# Patient Record
Sex: FEMALE | Race: WHITE | Employment: UNEMPLOYED | URBAN - METROPOLITAN AREA
[De-identification: names, ages, dates, MRNs, and addresses within clinical notes are randomized per-mention and may not be internally consistent; named-entity substitution may affect disease eponyms.]

---

## 2017-05-15 ENCOUNTER — HOSPITAL ENCOUNTER (OUTPATIENT)
Dept: CT IMAGING | Age: 47
Discharge: HOME | End: 2017-05-15
Attending: INTERNAL MEDICINE | Admitting: INTERNAL MEDICINE

## 2017-05-15 DIAGNOSIS — Z13.6 ENCOUNTER FOR SCREENING FOR CARDIOVASCULAR DISORDERS: ICD-10-CM

## 2017-05-15 DIAGNOSIS — Z13.6 SCREENING FOR ISCHEMIC HEART DISEASE: ICD-10-CM

## 2017-05-24 ENCOUNTER — OFFICE VISIT (OUTPATIENT)
Dept: INFECTIOUS DISEASES | Age: 47
End: 2017-05-24

## 2017-05-24 VITALS
HEART RATE: 95 BPM | OXYGEN SATURATION: 99 % | DIASTOLIC BLOOD PRESSURE: 60 MMHG | SYSTOLIC BLOOD PRESSURE: 100 MMHG | HEIGHT: 68 IN | TEMPERATURE: 98.2 F | WEIGHT: 136 LBS | BODY MASS INDEX: 20.61 KG/M2

## 2017-05-24 DIAGNOSIS — R53.83 FATIGUE, UNSPECIFIED TYPE: ICD-10-CM

## 2017-05-24 DIAGNOSIS — M25.542 ARTHRALGIA OF BOTH HANDS: Primary | ICD-10-CM

## 2017-05-24 DIAGNOSIS — M25.541 ARTHRALGIA OF BOTH HANDS: Primary | ICD-10-CM

## 2017-05-24 PROCEDURE — 99203 OFFICE O/P NEW LOW 30 MIN: CPT | Performed by: INTERNAL MEDICINE

## 2017-05-24 RX ORDER — BUPROPION HYDROCHLORIDE 150 MG/1
150 TABLET ORAL
COMMUNITY

## 2017-05-24 RX ORDER — AMOXICILLIN 500 MG/1
1000 TABLET, FILM COATED ORAL
COMMUNITY
Start: 2017-05-22 | End: 2017-05-28

## 2017-05-24 RX ORDER — DOXYCYCLINE HYCLATE 100 MG
100 TABLET ORAL
COMMUNITY
End: 2019-08-28

## 2018-12-04 ENCOUNTER — OFFICE VISIT (OUTPATIENT)
Dept: ENT CLINIC | Age: 48
End: 2018-12-04
Payer: COMMERCIAL

## 2018-12-04 VITALS
HEART RATE: 84 BPM | BODY MASS INDEX: 19.64 KG/M2 | SYSTOLIC BLOOD PRESSURE: 114 MMHG | DIASTOLIC BLOOD PRESSURE: 73 MMHG | WEIGHT: 137.2 LBS | HEIGHT: 70 IN

## 2018-12-04 DIAGNOSIS — J32.9 CHRONIC SINUSITIS, UNSPECIFIED LOCATION: Primary | ICD-10-CM

## 2018-12-04 PROCEDURE — 99243 OFF/OP CNSLTJ NEW/EST LOW 30: CPT | Performed by: OTOLARYNGOLOGY

## 2018-12-04 PROCEDURE — 31231 NASAL ENDOSCOPY DX: CPT | Performed by: OTOLARYNGOLOGY

## 2018-12-04 RX ORDER — DOXYCYCLINE HYCLATE 100 MG
100 TABLET ORAL 2 TIMES DAILY
COMMUNITY
End: 2019-08-28

## 2018-12-04 RX ORDER — DOXYCYCLINE 40 MG/1
40 CAPSULE ORAL EVERY MORNING
COMMUNITY
End: 2019-08-28

## 2018-12-04 RX ORDER — MESALAMINE 0.38 G/1
1.5 CAPSULE, EXTENDED RELEASE ORAL DAILY
COMMUNITY
End: 2019-08-28

## 2018-12-04 ASSESSMENT — ENCOUNTER SYMPTOMS: SINUS PRESSURE: 1

## 2018-12-04 NOTE — LETTER
[facility]  [address] [place] [address] 23 [id number]  Phone: [phone]  Fax: [phone]    Angelina Ramires MD        December 4, 2018       Patient: Yoan Mills   MR Number: [id number]   YOB: 1970   Date of Visit: 12/4/2018       Dear Dr. Tang Olivares:    Thank you for the request for consultation for Daniel Lowry to me for the evaluation of sinus problems. Below are the relevant portions of my assessment and plan of care. If you have questions, please do not hesitate to call me. I look forward to following Aruna Buenrostro along with you.     Sincerely,        Manisha Hutchinson MD    CC providers:  Susan Hager MD  [facility] [address]  [address] [id number]  [address] [place] [address] Avenue: [phone]

## 2018-12-04 NOTE — PROGRESS NOTES
Subjective:      Patient ID: Ramy Chew is a 46 y.o. female. HPI  Chief Complaint   Patient presents with    Right sided sinusitis  History of Present Illness:49 yo female with hx of prior left sided sinus surgery now presents with a 2 month history of nasal congestion. Pain and pressure. Nasal obstruction. Nasal Discharge: green crusts  Nasal Obstruction: Yes right; intermittent  Post Nasal Drainage: Yes  Nasal Bleeding: No  Sense of Smell: normal  History of Reflux Symptoms: No  HIstory of Facial/Head Trauma: No  Pain/Discomfort:No  Headaches: none  Previous Allergy Testing: Yes  Previous Allergy Shots: Yes  History of Asthma: No;  Aspirin Sensitivity: No  Eye Symptoms: none  Previous Treatments: steroid sprays and immunotherapy     There is no problem list on file for this patient. No past surgical history on file. No family history on file. Social History     Social History    Marital status:      Spouse name: N/A    Number of children: N/A    Years of education: N/A     Occupational History    Not on file. Social History Main Topics    Smoking status: Never Smoker    Smokeless tobacco: Not on file    Alcohol use Not on file    Drug use: Unknown    Sexual activity: Not on file     Other Topics Concern    Not on file     Social History Narrative    No narrative on file         Review of Systems   HENT: Positive for congestion and sinus pressure. Objective:   Physical Exam   Constitutional: She is oriented to person, place, and time. She appears well-developed and well-nourished. HENT:   Head: Normocephalic and atraumatic. Not macrocephalic and not microcephalic. Head is without raccoon's eyes, without Abel's sign, without abrasion, without contusion, without laceration, without right periorbital erythema and without left periorbital erythema. Hair is normal.   Right Ear: Hearing, tympanic membrane and external ear normal. No drainage, swelling or tenderness.  No submental, no submandibular, no tonsillar, no preauricular, no posterior auricular and no occipital adenopathy present. Head (left side): No submental, no submandibular, no tonsillar, no preauricular, no posterior auricular and no occipital adenopathy present. Right cervical: No superficial cervical, no deep cervical and no posterior cervical adenopathy present. Left cervical: No superficial cervical, no deep cervical and no posterior cervical adenopathy present. Neurological: She is alert and oriented to person, place, and time. Skin: No bruising, no laceration and no lesion noted. No erythema. Psychiatric: She has a normal mood and affect. Her speech is normal and behavior is normal.     Nasal Endoscopy    Pre OP: sinusitis  Post OP: nasal obstruction, deviated septum an, turbinate hypertorphy and anterior rhinitis. Procedure: Nasal endoscopy    After obtaining verbal consent from the patient 1% lidocaine with afrin was sprayed into the nasal cavities. After allowing a time for anesthesia, a nasal endoscope was placed into the nostril. The septum, inferior, and middle turbinates were examined. The middle meatus, and sphenoethmoid recess was examined bilaterally. Cultures were not obtained from the sinuses. There were no complications. Pertinent positives included: There was not edema and purulence in the left middle meatus. There was not edema and purulence at the right middle meatus. Polyps were not identified in the sinuses. Masses were not identified. Patent left nasal sinuses. Moderate deviation to right. Turbinate hypertrophy and rhinitis     Tolerated well without complication. I attest that I was present for and did the entire procedure myself. Assessment:       Diagnosis Orders   1. Chronic sinusitis, unspecified location  CT Sinus WO Contrast           Plan:      CT sinus with past history of chronic sinusitis. Saline nasal sprays.    Switch to Rhinocort

## 2018-12-10 ENCOUNTER — HOSPITAL ENCOUNTER (OUTPATIENT)
Dept: CT IMAGING | Age: 48
Discharge: HOME | End: 2018-12-10
Payer: COMMERCIAL

## 2018-12-10 DIAGNOSIS — J32.9 CHRONIC SINUSITIS, UNSPECIFIED LOCATION: ICD-10-CM

## 2018-12-10 PROCEDURE — 70486 CT MAXILLOFACIAL W/O DYE: CPT

## 2018-12-12 ENCOUNTER — OFFICE VISIT (OUTPATIENT)
Dept: ENT CLINIC | Age: 48
End: 2018-12-12
Payer: COMMERCIAL

## 2018-12-12 VITALS — HEART RATE: 73 BPM | DIASTOLIC BLOOD PRESSURE: 67 MMHG | SYSTOLIC BLOOD PRESSURE: 110 MMHG

## 2018-12-12 DIAGNOSIS — J34.3 NASAL TURBINATE HYPERTROPHY: ICD-10-CM

## 2018-12-12 DIAGNOSIS — J34.89 NASAL OBSTRUCTION: Primary | ICD-10-CM

## 2018-12-12 PROCEDURE — 99212 OFFICE O/P EST SF 10 MIN: CPT | Performed by: OTOLARYNGOLOGY

## 2018-12-12 ASSESSMENT — ENCOUNTER SYMPTOMS
COUGH: 0
SINUS PAIN: 0
EYE PAIN: 0
SHORTNESS OF BREATH: 0
RHINORRHEA: 0
TROUBLE SWALLOWING: 0
SORE THROAT: 0
EYE REDNESS: 0
VOICE CHANGE: 0
FACIAL SWELLING: 0
NAUSEA: 0
DIARRHEA: 0
EYE ITCHING: 0
CHOKING: 0
SINUS PRESSURE: 0

## 2019-01-09 ENCOUNTER — PROCEDURE VISIT (OUTPATIENT)
Dept: ENT CLINIC | Age: 49
End: 2019-01-09
Payer: COMMERCIAL

## 2019-01-09 DIAGNOSIS — J34.3 NASAL TURBINATE HYPERTROPHY: ICD-10-CM

## 2019-01-09 DIAGNOSIS — J34.89 NASAL OBSTRUCTION: ICD-10-CM

## 2019-01-09 PROCEDURE — 30802 ABLATE INF TURBINATE SUBMUC: CPT | Performed by: OTOLARYNGOLOGY

## 2019-03-06 ENCOUNTER — OFFICE VISIT (OUTPATIENT)
Dept: ENT CLINIC | Age: 49
End: 2019-03-06

## 2019-03-06 VITALS — HEART RATE: 80 BPM | SYSTOLIC BLOOD PRESSURE: 109 MMHG | DIASTOLIC BLOOD PRESSURE: 66 MMHG

## 2019-03-06 DIAGNOSIS — Z48.89 POSTOPERATIVE VISIT: Primary | ICD-10-CM

## 2019-03-06 PROCEDURE — 99024 POSTOP FOLLOW-UP VISIT: CPT | Performed by: OTOLARYNGOLOGY

## 2019-08-27 ENCOUNTER — HOSPITAL ENCOUNTER (OUTPATIENT)
Dept: MAMMOGRAPHY | Age: 49
Discharge: HOME | End: 2019-08-27
Payer: COMMERCIAL

## 2019-08-27 DIAGNOSIS — Z12.31 VISIT FOR SCREENING MAMMOGRAM: ICD-10-CM

## 2019-08-27 PROCEDURE — 77063 BREAST TOMOSYNTHESIS BI: CPT

## 2019-08-28 ENCOUNTER — OFFICE VISIT (OUTPATIENT)
Dept: FAMILY MEDICINE CLINIC | Age: 49
End: 2019-08-28
Payer: COMMERCIAL

## 2019-08-28 VITALS
OXYGEN SATURATION: 99 % | WEIGHT: 135.6 LBS | HEART RATE: 75 BPM | BODY MASS INDEX: 19.41 KG/M2 | DIASTOLIC BLOOD PRESSURE: 70 MMHG | HEIGHT: 70 IN | SYSTOLIC BLOOD PRESSURE: 108 MMHG

## 2019-08-28 DIAGNOSIS — Z00.00 WELL ADULT EXAM: Primary | ICD-10-CM

## 2019-08-28 PROCEDURE — 99386 PREV VISIT NEW AGE 40-64: CPT | Performed by: FAMILY MEDICINE

## 2019-08-28 RX ORDER — MESALAMINE 0.38 G/1
3 CAPSULE, EXTENDED RELEASE ORAL DAILY
COMMUNITY
Start: 2018-01-15 | End: 2019-10-16 | Stop reason: ALTCHOICE

## 2019-08-28 SDOH — HEALTH STABILITY: MENTAL HEALTH: HOW OFTEN DO YOU HAVE A DRINK CONTAINING ALCOHOL?: 2-3 TIMES A WEEK

## 2019-08-28 ASSESSMENT — ENCOUNTER SYMPTOMS: RESPIRATORY NEGATIVE: 1

## 2019-09-06 DIAGNOSIS — Z00.00 WELL ADULT EXAM: ICD-10-CM

## 2019-09-06 LAB
A/G RATIO: 1.6 (ref 1.1–2.2)
ALBUMIN SERPL-MCNC: 4.4 G/DL (ref 3.4–5)
ALP BLD-CCNC: 39 U/L (ref 40–129)
ALT SERPL-CCNC: 13 U/L (ref 10–40)
ANION GAP SERPL CALCULATED.3IONS-SCNC: 12 MMOL/L (ref 3–16)
AST SERPL-CCNC: 18 U/L (ref 15–37)
BILIRUB SERPL-MCNC: 0.6 MG/DL (ref 0–1)
BUN BLDV-MCNC: 11 MG/DL (ref 7–20)
CALCIUM SERPL-MCNC: 9.3 MG/DL (ref 8.3–10.6)
CHLORIDE BLD-SCNC: 105 MMOL/L (ref 99–110)
CHOLESTEROL, TOTAL: 185 MG/DL (ref 0–199)
CO2: 24 MMOL/L (ref 21–32)
CREAT SERPL-MCNC: 0.8 MG/DL (ref 0.6–1.1)
GFR AFRICAN AMERICAN: >60
GFR NON-AFRICAN AMERICAN: >60
GLOBULIN: 2.7 G/DL
GLUCOSE BLD-MCNC: 89 MG/DL (ref 70–99)
HCT VFR BLD CALC: 41.5 % (ref 36–48)
HDLC SERPL-MCNC: 53 MG/DL (ref 40–60)
HEMOGLOBIN: 13.5 G/DL (ref 12–16)
LDL CHOLESTEROL CALCULATED: 117 MG/DL
MCH RBC QN AUTO: 32.4 PG (ref 26–34)
MCHC RBC AUTO-ENTMCNC: 32.5 G/DL (ref 31–36)
MCV RBC AUTO: 99.7 FL (ref 80–100)
PDW BLD-RTO: 13.7 % (ref 12.4–15.4)
PLATELET # BLD: 303 K/UL (ref 135–450)
PMV BLD AUTO: 7.2 FL (ref 5–10.5)
POTASSIUM SERPL-SCNC: 4.6 MMOL/L (ref 3.5–5.1)
RBC # BLD: 4.16 M/UL (ref 4–5.2)
SODIUM BLD-SCNC: 141 MMOL/L (ref 136–145)
TOTAL PROTEIN: 7.1 G/DL (ref 6.4–8.2)
TRIGL SERPL-MCNC: 73 MG/DL (ref 0–150)
TSH REFLEX: 2.16 UIU/ML (ref 0.27–4.2)
VLDLC SERPL CALC-MCNC: 15 MG/DL
WBC # BLD: 6.5 K/UL (ref 4–11)

## 2019-09-07 LAB
HIV AG/AB: NORMAL
HIV ANTIGEN: NORMAL
HIV-1 ANTIBODY: NORMAL
HIV-2 AB: NORMAL

## 2019-10-16 ENCOUNTER — TELEPHONE (OUTPATIENT)
Dept: OBGYN CLINIC | Age: 49
End: 2019-10-16

## 2019-10-16 ENCOUNTER — OFFICE VISIT (OUTPATIENT)
Dept: OBGYN CLINIC | Age: 49
End: 2019-10-16
Payer: COMMERCIAL

## 2019-10-16 VITALS
TEMPERATURE: 97.8 F | SYSTOLIC BLOOD PRESSURE: 108 MMHG | DIASTOLIC BLOOD PRESSURE: 60 MMHG | HEART RATE: 82 BPM | HEIGHT: 67 IN | WEIGHT: 133.2 LBS | BODY MASS INDEX: 20.91 KG/M2

## 2019-10-16 DIAGNOSIS — N95.2 VAGINAL ATROPHY: ICD-10-CM

## 2019-10-16 DIAGNOSIS — Z01.419 ENCNTR FOR GYN EXAM (GENERAL) (ROUTINE) W/O ABN FINDINGS: Primary | ICD-10-CM

## 2019-10-16 DIAGNOSIS — N94.10 DYSPAREUNIA IN FEMALE: ICD-10-CM

## 2019-10-16 DIAGNOSIS — Z12.4 PAP SMEAR FOR CERVICAL CANCER SCREENING: ICD-10-CM

## 2019-10-16 DIAGNOSIS — Z79.890 ON HORMONE REPLACEMENT THERAPY: ICD-10-CM

## 2019-10-16 PROCEDURE — 99386 PREV VISIT NEW AGE 40-64: CPT | Performed by: OBSTETRICS & GYNECOLOGY

## 2019-10-16 RX ORDER — MESALAMINE 1.2 G/1
1 TABLET, DELAYED RELEASE ORAL DAILY
Refills: 11 | COMMUNITY
Start: 2019-10-05

## 2019-10-16 ASSESSMENT — ENCOUNTER SYMPTOMS
DIARRHEA: 0
CONSTIPATION: 0
ABDOMINAL PAIN: 0
COUGH: 0
NAUSEA: 0
BACK PAIN: 1
VOMITING: 0
SORE THROAT: 0
SHORTNESS OF BREATH: 0

## 2019-10-18 LAB
HPV COMMENT: NORMAL
HPV TYPE 16: NOT DETECTED
HPV TYPE 18: NOT DETECTED
HPVOH (OTHER TYPES): NOT DETECTED

## 2020-02-24 ENCOUNTER — OFFICE VISIT (OUTPATIENT)
Dept: FAMILY MEDICINE CLINIC | Age: 50
End: 2020-02-24
Payer: COMMERCIAL

## 2020-02-24 VITALS
OXYGEN SATURATION: 98 % | HEART RATE: 83 BPM | BODY MASS INDEX: 21.94 KG/M2 | HEIGHT: 67 IN | SYSTOLIC BLOOD PRESSURE: 102 MMHG | DIASTOLIC BLOOD PRESSURE: 68 MMHG | WEIGHT: 139.8 LBS

## 2020-02-24 DIAGNOSIS — R00.2 PALPITATIONS: ICD-10-CM

## 2020-02-24 LAB
ANION GAP SERPL CALCULATED.3IONS-SCNC: 13 MMOL/L (ref 3–16)
BUN BLDV-MCNC: 17 MG/DL (ref 7–20)
CALCIUM SERPL-MCNC: 9.5 MG/DL (ref 8.3–10.6)
CHLORIDE BLD-SCNC: 105 MMOL/L (ref 99–110)
CO2: 24 MMOL/L (ref 21–32)
CREAT SERPL-MCNC: 0.7 MG/DL (ref 0.6–1.1)
GFR AFRICAN AMERICAN: >60
GFR NON-AFRICAN AMERICAN: >60
GLUCOSE BLD-MCNC: 87 MG/DL (ref 70–99)
HCT VFR BLD CALC: 38.6 % (ref 36–48)
HEMOGLOBIN: 13.2 G/DL (ref 12–16)
MCH RBC QN AUTO: 32.4 PG (ref 26–34)
MCHC RBC AUTO-ENTMCNC: 34.2 G/DL (ref 31–36)
MCV RBC AUTO: 94.7 FL (ref 80–100)
PDW BLD-RTO: 13.8 % (ref 12.4–15.4)
PLATELET # BLD: 299 K/UL (ref 135–450)
PMV BLD AUTO: 7.6 FL (ref 5–10.5)
POTASSIUM SERPL-SCNC: 4.1 MMOL/L (ref 3.5–5.1)
RBC # BLD: 4.07 M/UL (ref 4–5.2)
SODIUM BLD-SCNC: 142 MMOL/L (ref 136–145)
TSH REFLEX: 1.83 UIU/ML (ref 0.27–4.2)
WBC # BLD: 8 K/UL (ref 4–11)

## 2020-02-24 PROCEDURE — 99213 OFFICE O/P EST LOW 20 MIN: CPT | Performed by: FAMILY MEDICINE

## 2020-02-24 PROCEDURE — 93000 ELECTROCARDIOGRAM COMPLETE: CPT | Performed by: FAMILY MEDICINE

## 2020-02-24 ASSESSMENT — PATIENT HEALTH QUESTIONNAIRE - PHQ9
2. FEELING DOWN, DEPRESSED OR HOPELESS: 0
SUM OF ALL RESPONSES TO PHQ QUESTIONS 1-9: 0
SUM OF ALL RESPONSES TO PHQ9 QUESTIONS 1 & 2: 0
SUM OF ALL RESPONSES TO PHQ QUESTIONS 1-9: 0
1. LITTLE INTEREST OR PLEASURE IN DOING THINGS: 0

## 2020-02-24 ASSESSMENT — ENCOUNTER SYMPTOMS: RESPIRATORY NEGATIVE: 1

## 2020-02-24 NOTE — PROGRESS NOTES
Subjective:      Patient ID: Mare Ortiz is a 44 y.o. female. HPI   Pt is a of 44 y.o. female comes in today with   Chief Complaint   Patient presents with    Palpitations     Patient complains of heart palpitations in the middle of the night, has occured a couple of times. Denies anxiety, states it wakes her up at night. Month ago had 2 minute episode in the middle of the night. Could feel heart beating really hard. Recurred when driving. A few other instances where much briefer. A lot of exercise-no intolerance or exertional symptoms. Past Medical History:Reviewed  Medications:Reviewed. Allergies   Allergen Reactions    Sulfa Antibiotics Hives and Rash    Sulfamethoxazole-Trimethoprim Hives and Rash      Social hx:Reviewed. Social History     Tobacco Use   Smoking Status Never Smoker   Smokeless Tobacco Never Used     Review of Systems   Constitutional: Negative. Respiratory: Negative. Past Medical History:Reviewed  Medications:Reviewed. Allergies   Allergen Reactions    Sulfa Antibiotics Hives and Rash    Sulfamethoxazole-Trimethoprim Hives and Rash      Social hx:Reviewed. Social History     Tobacco Use   Smoking Status Never Smoker   Smokeless Tobacco Never Used     Objective:   Physical Exam  Constitutional:       Appearance: She is well-developed. HENT:      Head: Normocephalic and atraumatic. Eyes:      General: No scleral icterus. Conjunctiva/sclera: Conjunctivae normal.   Neck:      Musculoskeletal: Neck supple. Thyroid: No thyromegaly. Vascular: No carotid bruit. Cardiovascular:      Rate and Rhythm: Normal rate and regular rhythm. Heart sounds: Normal heart sounds. No murmur. Pulmonary:      Effort: Pulmonary effort is normal.      Breath sounds: Normal breath sounds. No rales. Lymphadenopathy:      Cervical: No cervical adenopathy. Skin:     General: Skin is warm and dry. Nails: There is no clubbing.      Neurological:      Mental Status: She is alert and oriented to person, place, and time. Cranial Nerves: No cranial nerve deficit. Psychiatric:         Behavior: Behavior normal.         Assessment:       Diagnosis Orders   1. Palpitations  CBC    TSH with Reflex    BASIC METABOLIC PANEL    EKG 12 Lead          Plan:      ekg nsr  Sounds benign.  Holter/echo if bloodwork okay and getting worse        Lloyd Flores MD

## 2020-05-07 ENCOUNTER — OFFICE VISIT (OUTPATIENT)
Dept: ORTHOPEDIC SURGERY | Age: 50
End: 2020-05-07
Payer: COMMERCIAL

## 2020-05-07 VITALS
DIASTOLIC BLOOD PRESSURE: 77 MMHG | BODY MASS INDEX: 22.46 KG/M2 | WEIGHT: 139.77 LBS | HEIGHT: 66 IN | HEART RATE: 76 BPM | TEMPERATURE: 97.2 F | SYSTOLIC BLOOD PRESSURE: 111 MMHG

## 2020-05-07 PROCEDURE — 20610 DRAIN/INJ JOINT/BURSA W/O US: CPT | Performed by: PHYSICIAN ASSISTANT

## 2020-05-07 PROCEDURE — 99203 OFFICE O/P NEW LOW 30 MIN: CPT | Performed by: PHYSICIAN ASSISTANT

## 2020-05-07 RX ORDER — LIDOCAINE HYDROCHLORIDE 10 MG/ML
20 INJECTION, SOLUTION INFILTRATION; PERINEURAL ONCE
Status: COMPLETED | OUTPATIENT
Start: 2020-05-07 | End: 2020-05-07

## 2020-05-07 RX ORDER — METHYLPREDNISOLONE ACETATE 40 MG/ML
40 INJECTION, SUSPENSION INTRA-ARTICULAR; INTRALESIONAL; INTRAMUSCULAR; SOFT TISSUE ONCE
Status: COMPLETED | OUTPATIENT
Start: 2020-05-07 | End: 2020-05-07

## 2020-05-07 RX ADMIN — METHYLPREDNISOLONE ACETATE 40 MG: 40 INJECTION, SUSPENSION INTRA-ARTICULAR; INTRALESIONAL; INTRAMUSCULAR; SOFT TISSUE at 10:54

## 2020-05-07 RX ADMIN — LIDOCAINE HYDROCHLORIDE 20 ML: 10 INJECTION, SOLUTION INFILTRATION; PERINEURAL at 10:52

## 2020-05-12 NOTE — PROGRESS NOTES
Past Surgical History:   Procedure Laterality Date    COLONOSCOPY      Every 2-3 years    NECK SURGERY      SINUS SURGERY      TONSILLECTOMY         Family History   Problem Relation Age of Onset    Heart Disease Paternal Grandfather     Heart Attack Paternal Grandfather     Thyroid Disease Paternal Grandmother     No Known Problems Maternal Grandfather     Heart Disease Father     Rheum Arthritis Mother     No Known Problems Brother     Other Sister         chrones    No Known Problems Brother     No Known Problems Brother     Other Sister         chrones    Liver Disease Sister         hep c    No Known Problems Brother     No Known Problems Brother     No Known Problems Daughter     No Known Problems Son        Social History     Socioeconomic History    Marital status:      Spouse name: None    Number of children: None    Years of education: None    Highest education level: None   Occupational History    None   Social Needs    Financial resource strain: None    Food insecurity     Worry: None     Inability: None    Transportation needs     Medical: None     Non-medical: None   Tobacco Use    Smoking status: Never Smoker    Smokeless tobacco: Never Used   Substance and Sexual Activity    Alcohol use: Yes     Frequency: 2-3 times a week     Comment: social    Drug use: Never    Sexual activity: Yes     Partners: Male   Lifestyle    Physical activity     Days per week: None     Minutes per session: None    Stress: None   Relationships    Social connections     Talks on phone: None     Gets together: None     Attends Yazdanism service: None     Active member of club or organization: None     Attends meetings of clubs or organizations: None     Relationship status: None    Intimate partner violence     Fear of current or ex partner: None     Emotionally abused: None     Physically abused: None     Forced sexual activity: None   Other Topics Concern    None   Social was educated on decreasing her activity in order to let the pain and inflammation her right knee to calm down. Patient was also educated on other conservative therapy that she can utilize for her condition as detailed in the treatment plan below. She was counseled on appropriate progressive regimen to increase her running/cardiovascular exercise. Patient notes that she does have an elliptical at home and she was encouraged to utilize this with no increase in elevation or incline. Patient should return to the office for follow-up evaluation in 4 to 6 weeks should her pain persist or sooner should her condition worsen. All the patient's questions were answered while in the clinic. The patient is understanding of all instructions and agrees with the plan. Patient does not smoke and did not require smoking cessation patient education. Treatment Plan:    1. Observe postinjection precautions  2. Decrease exercise and recreational activity with a progressive return as discussed  3. Activity modification  4. Ice 20 minutes ever 1-2 hours PRN  5. NSAIDs as needed  6. Rest   7. Elevation at least 2 inches above the heart with pillows supporting the joint underneath  8. Lightweight exercise/low impact exercise  9. Appropriate diet/weight management      Follow up in: 4 to 6 weeks or as needed              OXANA Lacey    Physician Assistant - Certified  [employer] and [address] [place] [address]    05/12/20 12:19 PM          This dictation was performed with a verbal recognition program (DRAGON) and it was checked for errors. It is possible that there are still dictated errors within this office note. If so, please bring any errors to my attention for an addendum. All efforts were made to ensure that this office note is accurate.

## 2020-05-20 LAB
A/G RATIO: 1.5 (ref 1.1–2.2)
ALBUMIN SERPL-MCNC: 5.1 G/DL (ref 3.4–5)
ALP BLD-CCNC: 43 U/L (ref 40–129)
ALT SERPL-CCNC: 12 U/L (ref 10–40)
ANION GAP SERPL CALCULATED.3IONS-SCNC: 14 MMOL/L (ref 3–16)
AST SERPL-CCNC: 14 U/L (ref 15–37)
BASOPHILS ABSOLUTE: 0.1 K/UL (ref 0–0.2)
BASOPHILS RELATIVE PERCENT: 1.3 %
BILIRUB SERPL-MCNC: 0.4 MG/DL (ref 0–1)
BUN BLDV-MCNC: 15 MG/DL (ref 7–20)
C-REACTIVE PROTEIN: 0.8 MG/L (ref 0–5.1)
CALCIUM SERPL-MCNC: 10.1 MG/DL (ref 8.3–10.6)
CHLORIDE BLD-SCNC: 102 MMOL/L (ref 99–110)
CO2: 24 MMOL/L (ref 21–32)
CREAT SERPL-MCNC: 1.1 MG/DL (ref 0.6–1.1)
EOSINOPHILS ABSOLUTE: 0.6 K/UL (ref 0–0.6)
EOSINOPHILS RELATIVE PERCENT: 6.5 %
GFR AFRICAN AMERICAN: >60
GFR NON-AFRICAN AMERICAN: 52
GLOBULIN: 3.4 G/DL
GLUCOSE BLD-MCNC: 81 MG/DL (ref 70–99)
HCT VFR BLD CALC: 42.8 % (ref 36–48)
HEMOGLOBIN: 14.2 G/DL (ref 12–16)
LYMPHOCYTES ABSOLUTE: 2.2 K/UL (ref 1–5.1)
LYMPHOCYTES RELATIVE PERCENT: 25.8 %
MCH RBC QN AUTO: 31.8 PG (ref 26–34)
MCHC RBC AUTO-ENTMCNC: 33.3 G/DL (ref 31–36)
MCV RBC AUTO: 95.5 FL (ref 80–100)
MONOCYTES ABSOLUTE: 0.5 K/UL (ref 0–1.3)
MONOCYTES RELATIVE PERCENT: 5.9 %
NEUTROPHILS ABSOLUTE: 5.1 K/UL (ref 1.7–7.7)
NEUTROPHILS RELATIVE PERCENT: 60.5 %
PDW BLD-RTO: 13.4 % (ref 12.4–15.4)
PLATELET # BLD: 329 K/UL (ref 135–450)
PMV BLD AUTO: 7.7 FL (ref 5–10.5)
POTASSIUM SERPL-SCNC: 4 MMOL/L (ref 3.5–5.1)
RBC # BLD: 4.49 M/UL (ref 4–5.2)
SODIUM BLD-SCNC: 140 MMOL/L (ref 136–145)
TOTAL PROTEIN: 8.5 G/DL (ref 6.4–8.2)
WBC # BLD: 8.4 K/UL (ref 4–11)

## 2020-07-17 ENCOUNTER — NURSE ONLY (OUTPATIENT)
Dept: PRIMARY CARE CLINIC | Age: 50
End: 2020-07-17
Payer: COMMERCIAL

## 2020-07-17 PROCEDURE — 99211 OFF/OP EST MAY X REQ PHY/QHP: CPT | Performed by: NURSE PRACTITIONER

## 2020-07-17 NOTE — PROGRESS NOTES
Marilu Lutz received a viral test for COVID-19. They were educated on isolation and quarantine as appropriate. For any symptoms, they were directed to seek care from their PCP, given contact information to establish with a doctor, directed to an urgent care or the emergency room.

## 2020-07-20 LAB
REPORT: NORMAL
SARS-COV-2: NOT DETECTED
THIS TEST SENT TO: NORMAL

## 2020-07-21 ENCOUNTER — TELEPHONE (OUTPATIENT)
Dept: FAMILY MEDICINE CLINIC | Age: 50
End: 2020-07-21

## 2020-07-21 NOTE — TELEPHONE ENCOUNTER
----- Message from Cj Delatorre. sent at 7/21/2020  3:36 PM EDT -----  Subject: Referral Request    QUESTIONS   Reason for referral request? Dermatologist     Has the physician seen you for this condition before? No     Preferred Specialist (if applicable)? Yuliet Parsons    Do you already have an appointment scheduled? No    Additional Information for Provider? Patient would like referral sent to [address] [place] [address]   ---------------------------------------------------------------------------  --------------  [address]  What is the best way for the office to contact you? OK to leave message on   voicemail   OK to respond with secure message via [employer] portal (only for patients   who have registered [employer] account)  Preferred Call Back Phone Number?  [phone]

## 2020-07-23 ENCOUNTER — ANESTHESIA (OUTPATIENT)
Dept: ENDOSCOPY | Age: 50
End: 2020-07-23
Payer: COMMERCIAL

## 2020-07-23 ENCOUNTER — ANESTHESIA EVENT (OUTPATIENT)
Dept: ENDOSCOPY | Age: 50
End: 2020-07-23
Payer: COMMERCIAL

## 2020-07-23 ENCOUNTER — HOSPITAL ENCOUNTER (OUTPATIENT)
Age: 50
Setting detail: OUTPATIENT SURGERY
Discharge: HOME | End: 2020-07-23
Attending: INTERNAL MEDICINE | Admitting: INTERNAL MEDICINE
Payer: COMMERCIAL

## 2020-07-23 VITALS
RESPIRATION RATE: 18 BRPM | SYSTOLIC BLOOD PRESSURE: 133 MMHG | TEMPERATURE: 97.6 F | DIASTOLIC BLOOD PRESSURE: 73 MMHG | HEIGHT: 68 IN | HEART RATE: 72 BPM | WEIGHT: 133 LBS | OXYGEN SATURATION: 100 % | BODY MASS INDEX: 20.16 KG/M2

## 2020-07-23 VITALS
OXYGEN SATURATION: 99 % | DIASTOLIC BLOOD PRESSURE: 71 MMHG | SYSTOLIC BLOOD PRESSURE: 92 MMHG | RESPIRATION RATE: 13 BRPM

## 2020-07-23 PROCEDURE — 3609015300 HC ESOPHAGEAL DILATION MALONEY: Performed by: INTERNAL MEDICINE

## 2020-07-23 PROCEDURE — 3700000000 HC ANESTHESIA ATTENDED CARE: Performed by: INTERNAL MEDICINE

## 2020-07-23 PROCEDURE — 2709999900 HC NON-CHARGEABLE SUPPLY: Performed by: INTERNAL MEDICINE

## 2020-07-23 PROCEDURE — 2580000003 HC RX 258: Performed by: ANESTHESIOLOGY

## 2020-07-23 PROCEDURE — 3609012400 HC EGD TRANSORAL BIOPSY SINGLE/MULTIPLE: Performed by: INTERNAL MEDICINE

## 2020-07-23 PROCEDURE — 6360000002 HC RX W HCPCS: Performed by: NURSE ANESTHETIST, CERTIFIED REGISTERED

## 2020-07-23 PROCEDURE — 7100000010 HC PHASE II RECOVERY - FIRST 15 MIN: Performed by: INTERNAL MEDICINE

## 2020-07-23 PROCEDURE — 3609010300 HC COLONOSCOPY W/BIOPSY SINGLE/MULTIPLE: Performed by: INTERNAL MEDICINE

## 2020-07-23 PROCEDURE — 88305 TISSUE EXAM BY PATHOLOGIST: CPT

## 2020-07-23 PROCEDURE — 7100000011 HC PHASE II RECOVERY - ADDTL 15 MIN: Performed by: INTERNAL MEDICINE

## 2020-07-23 PROCEDURE — 3700000001 HC ADD 15 MINUTES (ANESTHESIA): Performed by: INTERNAL MEDICINE

## 2020-07-23 RX ORDER — PROPOFOL 10 MG/ML
INJECTION, EMULSION INTRAVENOUS PRN
Status: DISCONTINUED | OUTPATIENT
Start: 2020-07-23 | End: 2020-07-23 | Stop reason: SDUPTHER

## 2020-07-23 RX ORDER — SODIUM CHLORIDE, SODIUM LACTATE, POTASSIUM CHLORIDE, CALCIUM CHLORIDE 600; 310; 30; 20 MG/100ML; MG/100ML; MG/100ML; MG/100ML
INJECTION, SOLUTION INTRAVENOUS CONTINUOUS
Status: DISCONTINUED | OUTPATIENT
Start: 2020-07-23 | End: 2020-07-23 | Stop reason: HOSPADM

## 2020-07-23 RX ADMIN — PROPOFOL 100 MG: 10 INJECTION, EMULSION INTRAVENOUS at 10:17

## 2020-07-23 RX ADMIN — PROPOFOL 50 MG: 10 INJECTION, EMULSION INTRAVENOUS at 10:36

## 2020-07-23 RX ADMIN — PROPOFOL 50 MG: 10 INJECTION, EMULSION INTRAVENOUS at 10:23

## 2020-07-23 RX ADMIN — PROPOFOL 100 MG: 10 INJECTION, EMULSION INTRAVENOUS at 10:12

## 2020-07-23 RX ADMIN — SODIUM CHLORIDE, POTASSIUM CHLORIDE, SODIUM LACTATE AND CALCIUM CHLORIDE: 600; 310; 30; 20 INJECTION, SOLUTION INTRAVENOUS at 09:41

## 2020-07-23 RX ADMIN — PROPOFOL 50 MG: 10 INJECTION, EMULSION INTRAVENOUS at 10:27

## 2020-07-23 ASSESSMENT — PULMONARY FUNCTION TESTS
PIF_VALUE: 0
PIF_VALUE: 2
PIF_VALUE: 0

## 2020-07-23 ASSESSMENT — LIFESTYLE VARIABLES: SMOKING_STATUS: 0

## 2020-07-23 ASSESSMENT — PAIN SCALES - GENERAL: PAINLEVEL_OUTOF10: 0

## 2020-07-23 ASSESSMENT — PAIN - FUNCTIONAL ASSESSMENT: PAIN_FUNCTIONAL_ASSESSMENT: 0-10

## 2020-07-23 NOTE — OP NOTE
[address] [place] [address]  Endoscopy Note    Patient: Brunetta Dakin  : 1970     Procedure: Colonoscopy    Date:  2020    Surgeon:  Owen Kwon MD    Anesthesia:  MAC    Indications:  Crohn's disease    Procedure: An informed consent was obtained from the patient after explanation of indications, benefits, possible risks and complications of the procedure. The patient was then taken to the endoscopy suite, placed in the left lateral decubitus position, and the above IV anesthesia was administered. A digital rectal examination was performed and revealed negative without mass, lesions or tenderness, external hemorrhoids noted. The Olympus CFQ-180-AL video colonoscope was placed in the patient's rectum under digital direction and advanced to the cecum. The cecum was identified by characteristic anatomy and ballottment. The prep was good. The scope was then withdrawn back through the cecum, ascending, transverse, descending and sigmoid colons. The scope was then withdrawn into the rectum and retroflexed. The retroflexed view of the anal verge and rectum demonstrates no abnormalities. The scope was straightened, the colon was decompressed and the scope was withdrawn from the patient. The patient tolerated the procedure well and was taken to the PACU in good condition. Findings:     Terminal ileum: normal.   Cecum: loss of vascularity noted, no active inflammation.  Ascending Colon: loss of vascularity noted, no active inflammation.  Transverse Colon: loss of vascularity noted, no active inflammation.  Descending Colon: loss of vascularity noted, no active inflammation.  Sigmoid Colon: loss of vascularity noted, no active inflammation.  Rectum: loss of vascularity noted, no active inflammation. Multiple biopsies were taken from the colon at 10 cm intervals.   Estimated Blood Loss: None      Impression:     No active inflammation   External hemorrhoids. Recommendations:     Patient to call for biopsy results in 1 week.  Repeat Colonoscopy in 3 years.  Follow up with DR. Swann.     Deshawn Claudio, [address] [facility] [address]  7/23/2020

## 2020-07-23 NOTE — ANESTHESIA POSTPROCEDURE EVALUATION
Department of Anesthesiology  Postprocedure Note    Patient: Brain Ryan  MRN: [id number]  YOB: 1970  Date of evaluation: 7/23/2020  Time:  10:52 AM     Procedure Summary     Date:  07/23/20 Room / Location:  [facility]    Anesthesia Start:  4051 Anesthesia Stop:  4776    Procedures:       EGD BIOPSY (N/A )      COLONOSCOPY WITH BIOPSY (N/A )      ESOPHAGEAL DILATION LEIGH (N/A ) Diagnosis:  (CROHN'S DISEASE, DIARRHEA)    Surgeon:  Chapo Sarmiento MD Responsible Provider:  Army Palomo DO    Anesthesia Type:  MAC ASA Status:  2          Anesthesia Type: MAC    Sri Phase I: Sri Score: 10    Sri Phase II:      Last vitals: Reviewed and per EMR flowsheets.        Anesthesia Post Evaluation    Patient location during evaluation: PACU  Patient participation: complete - patient participated  Level of consciousness: awake and alert  Pain score: 0  Airway patency: patent  Nausea & Vomiting: no nausea and no vomiting  Complications: no  Cardiovascular status: hemodynamically stable  Respiratory status: acceptable  Hydration status: stable

## 2020-07-23 NOTE — PROGRESS NOTES
Ambulatory Surgery/Procedure Discharge Note    Vitals:    07/23/20 1104   BP: 133/73   Pulse: 72   Resp: 18   Temp: 97.6 °F (36.4 °C)   SpO2: 100%     Patient meets discharge criteria based on Sri score. In: 36 [P.O.:120; I.V.:700]  Out: -     Restroom use offered before discharge. Yes    Pain assessment:  none  Pain Level: 0    Discharge instructions reviewed with  Nesha Felipe, no prescriptions this visit. Abdomen soft, non-distended, + flatus. Denies any pain or discomfort. Patient to call Dr. Celso Vuong in 1 week for biospy results. Patient discharged to home/self care.  Patient discharged via wheel chair by transporter to waiting family/S.O.       7/23/2020 11:06 AM

## 2020-07-23 NOTE — ANESTHESIA PRE PROCEDURE
Department of Anesthesiology  Preprocedure Note       Name:  Amira Robles   Age:  44 y.o.  :  1970                                          MRN:  [id number]         Date:  2020      Surgeon: Odalis Penn):  Livia Deluna MD    Procedure: Procedure(s):  EGD ESOPHAGOGASTRODUODENOSCOPY  COLONOSCOPY DIAGNOSTIC    Medications prior to admission:   Prior to Admission medications    Medication Sig Start Date End Date Taking? Authorizing Provider   mesalamine (LIALDA) 1.2 g EC tablet Take 1 tablet by mouth daily 10/5/19  Yes Historical Provider, MD   progesterone (PROMETRIUM) 200 MG capsule Take 200 mg by mouth daily   Yes Historical Provider, MD   buPROPion (WELLBUTRIN XL) 150 MG extended release tablet Take 150 mg by mouth   Yes Historical Provider, MD       Current medications:    No current facility-administered medications for this encounter. Allergies:     Allergies   Allergen Reactions    Sulfa Antibiotics Hives and Rash    Sulfamethoxazole-Trimethoprim Hives and Rash       Problem List:    Patient Active Problem List   Diagnosis Code    On hormone replacement therapy Z79.890       Past Medical History:        Diagnosis Date    Abnormal Pap smear of cervix     Anxiety     Autoimmune disorder ([facility]     Crohn disease ([facility]     Depression     Menopausal symptoms     Scoliosis        Past Surgical History:        Procedure Laterality Date    BACK SURGERY      jia placed for scoliosis age 13    COLONOSCOPY      Every 2-3 years    NECK SURGERY      SINUS SURGERY      TONSILLECTOMY         Social History:    Social History     Tobacco Use    Smoking status: Never Smoker    Smokeless tobacco: Never Used   Substance Use Topics    Alcohol use: Yes     Frequency: 2-3 times a week     Comment: 2-3 times per week                                Counseling given: Not Answered      Vital Signs (Current):   Vitals:    20 0909   BP: 107/67   Pulse: 64   Resp: 18   Temp: 97.8 °F (36.6 °C)   TempSrc: Oral   SpO2: 100%   Weight: 133 lb (60.3 kg)   Height: 5' 8\" (1.727 m)                                              BP Readings from Last 3 Encounters:   07/23/20 107/67   05/07/20 111/77   02/24/20 102/68       NPO Status: Time of last liquid consumption: 2200                        Time of last solid consumption: 2000                        Date of last liquid consumption: 07/22/20                        Date of last solid food consumption: 07/21/20    BMI:   Wt Readings from Last 3 Encounters:   07/23/20 133 lb (60.3 kg)   05/07/20 139 lb 12.4 oz (63.4 kg)   02/24/20 139 lb 12.8 oz (63.4 kg)     Body mass index is 20.22 kg/m². CBC:   Lab Results   Component Value Date    WBC 8.4 05/20/2020    RBC 4.49 05/20/2020    HGB 14.2 05/20/2020    HCT 42.8 05/20/2020    MCV 95.5 05/20/2020    RDW 13.4 05/20/2020     05/20/2020       CMP:   Lab Results   Component Value Date     05/20/2020    K 4.0 05/20/2020     05/20/2020    CO2 24 05/20/2020    BUN 15 05/20/2020    CREATININE 1.1 05/20/2020    GFRAA >60 05/20/2020    AGRATIO 1.5 05/20/2020    LABGLOM 52 05/20/2020    GLUCOSE 81 05/20/2020    PROT 8.5 05/20/2020    CALCIUM 10.1 05/20/2020    BILITOT 0.4 05/20/2020    ALKPHOS 43 05/20/2020    AST 14 05/20/2020    ALT 12 05/20/2020       POC Tests: No results for input(s): POCGLU, POCNA, POCK, POCCL, POCBUN, POCHEMO, POCHCT in the last 72 hours.     Coags: No results found for: PROTIME, INR, APTT    HCG (If Applicable): No results found for: PREGTESTUR, PREGSERUM, HCG, HCGQUANT     ABGs: No results found for: PHART, PO2ART, NVT4YMG, UOA7KGV, BEART, I5UJZEIL     Type & Screen (If Applicable):  No results found for: LABABO, LABRH    Drug/Infectious Status (If Applicable):  No results found for: HIV, HEPCAB    COVID-19 Screening (If Applicable):   Lab Results   Component Value Date    COVID19 Not Detected 07/17/2020         Anesthesia Evaluation  Patient summary reviewed and Nursing notes reviewed no history of anesthetic complications:   Airway: Mallampati: I  TM distance: >3 FB   Neck ROM: full  Mouth opening: > = 3 FB Dental: normal exam         Pulmonary: breath sounds clear to auscultation      (-) not a current smoker (never)                           Cardiovascular:  Exercise tolerance: good (>4 METS),       (-) past MI    NYHA Classification: I    Rhythm: regular  Rate: normal           Beta Blocker:  Not on Beta Blocker         Neuro/Psych:               GI/Hepatic/Renal:   (+) morbid obesity         ROS comment: crohns dz . Endo/Other:                     Abdominal:           Vascular:                                        Anesthesia Plan      MAC     ASA 2       Induction: intravenous. MIPS: Prophylactic antiemetics administered. Anesthetic plan and risks discussed with patient. Plan discussed with CRNA.     Attending anesthesiologist reviewed and agrees with Pre Eval content              Ed Sauceda DO   7/23/2020

## 2020-07-23 NOTE — OP NOTE
[address] [place] [address]  Endoscopy Note    Patient: Brunetta Dakin  : 1970     Procedure: Esophagogastroduodenoscopy with biopsy and esophageal balloon dilation    Date:  2020     Anesthesia: MAC    Indications: Dysphagia. Description of Procedure:  Informed consent was obtained from the patient after explanation of indications, benefits and possible risks and complications of the procedure. The procedure was done at bedside. Patient was placed in the left lateral decubitus position and the above IV sedation was administrered. The Olympus videoendoscope was placed in the patient's mouth and under direct visualization passed into the esophagus. The scope was then advanced into the stomach and then into the second portion of the duodenum. · The duodenum showed no abnormalities. · The stomach showed non erosive gastritis in the antrum and body. Biopsies taken. · Esophagus demonstrated no abnormalities. However, given her symptoms of dysphagia, esophagus was dilated with 54 Fr parish dilator. Retroflexed views of the cardia and fundus showed no other abnormalities. The scope was anteflexed and the stomach was decompressed, and the scope was completely withdrawn. The patient tolerated the procedure well. EBL: None    Impression:     Normal esophagus, dilated to 54 Fr.   Non erosive gastritis      Recommendations:    Patient to call for biopsy results in 7 days.    PPI    Owen Kwon MD  [address] [facility]

## 2020-07-23 NOTE — H&P
History and Physical / Pre-Sedation Assessment      PMHx:   Past Medical History:   Diagnosis Date    Abnormal Pap smear of cervix     Anxiety     Autoimmune disorder (HCC)     Crohn disease ([facility] [address]     Depression     Menopausal symptoms     Scoliosis        Medications:   Prior to Admission medications    Medication Sig Start Date End Date Taking? Authorizing Provider   mesalamine (LIALDA) 1.2 g EC tablet Take 1 tablet by mouth daily 10/5/19  Yes Historical Provider, MD   progesterone (PROMETRIUM) 200 MG capsule Take 200 mg by mouth daily   Yes Historical Provider, MD   buPROPion (WELLBUTRIN XL) 150 MG extended release tablet Take 150 mg by mouth   Yes Historical Provider, MD       Allergies:    Allergies   Allergen Reactions    Sulfa Antibiotics Hives and Rash    Sulfamethoxazole-Trimethoprim Hives and Rash       PSHx:   Past Surgical History:   Procedure Laterality Date    BACK SURGERY      jia placed for scoliosis age 14    COLONOSCOPY      Every 2-3 years    NECK SURGERY      SINUS SURGERY      TONSILLECTOMY         Social Hx:   Social History     Socioeconomic History    Marital status:      Spouse name: Not on file    Number of children: Not on file    Years of education: Not on file    Highest education level: Not on file   Occupational History    Not on file   Social Needs    Financial resource strain: Not on file    Food insecurity     Worry: Not on file     Inability: Not on file    Transportation needs     Medical: Not on file     Non-medical: Not on file   Tobacco Use    Smoking status: Never Smoker    Smokeless tobacco: Never Used   Substance and Sexual Activity    Alcohol use: Yes     Frequency: 2-3 times a week     Comment: 2-3 times per week    Drug use: Never    Sexual activity: Yes     Partners: Male   Lifestyle    Physical activity     Days per week: Not on file     Minutes per session: Not on file    Stress: Not on file   Relationships    Social connections Talks on phone: Not on file     Gets together: Not on file     Attends Mandaen service: Not on file     Active member of club or organization: Not on file     Attends meetings of clubs or organizations: Not on file     Relationship status: Not on file    Intimate partner violence     Fear of current or ex partner: Not on file     Emotionally abused: Not on file     Physically abused: Not on file     Forced sexual activity: Not on file   Other Topics Concern    Not on file   Social History Narrative    Not on file       Family Hx:   Family History   Problem Relation Age of Onset    Heart Disease Paternal Grandfather     Heart Attack Paternal Grandfather     Thyroid Disease Paternal Grandmother     No Known Problems Maternal Grandfather     Heart Disease Father     Rheum Arthritis Mother     No Known Problems Brother     Other Sister         chrones    No Known Problems Brother     No Known Problems Brother     Other Sister         chrones    Liver Disease Sister         hep c    No Known Problems Brother     No Known Problems Brother     No Known Problems Daughter     No Known Problems Son        Physical Exam:  Vital Signs: /67   Pulse 64   Temp 97.8 °F (36.6 °C) (Oral)   Resp 18   Ht 5' 8\" (1.727 m)   Wt 133 lb (60.3 kg)   SpO2 100%   BMI 20.22 kg/m²    Airway: Mallampati: II (soft palate, uvula, fauces visible)  Pulmonary:Normal  Cardiac:Normal  Abdomen:Normal    Pre-Procedure Assessment / Plan:  ASA: Class 2 - A normal healthy patient with mild systemic disease  Level of Sedation Plan:Deep sedation  Post Procedure plan: Return to same level of care    I assessed the patient and find that the patient is in satisfactory condition to proceed with the planned procedure and sedation plan. I have explained the risk, benefits, and alternatives to the procedure; the patient understands and agrees to proceed.        Donte Ramirez  7/23/2020

## 2020-08-03 ENCOUNTER — TELEPHONE (OUTPATIENT)
Dept: DERMATOLOGY | Age: 50
End: 2020-08-03

## 2020-08-06 ENCOUNTER — OFFICE VISIT (OUTPATIENT)
Dept: DERMATOLOGY | Age: 50
End: 2020-08-06
Payer: COMMERCIAL

## 2020-08-06 VITALS — TEMPERATURE: 98.1 F

## 2020-08-06 PROCEDURE — 11103 TANGNTL BX SKIN EA SEP/ADDL: CPT | Performed by: DERMATOLOGY

## 2020-08-06 PROCEDURE — 11102 TANGNTL BX SKIN SINGLE LES: CPT | Performed by: DERMATOLOGY

## 2020-08-06 PROCEDURE — 99243 OFF/OP CNSLTJ NEW/EST LOW 30: CPT | Performed by: DERMATOLOGY

## 2020-08-06 NOTE — PATIENT INSTRUCTIONS
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
## 2020-08-06 NOTE — Clinical Note
Dear Bessie Ponce,    I had the pleasure of seeing your patient, Kalen Lima, in my office today. Thanks so much for involving me in her care. Please see my note and call me if you have any questions.     Clarence Gould

## 2020-08-11 LAB — DERMATOLOGY PATHOLOGY REPORT: ABNORMAL

## 2020-08-14 ENCOUNTER — TELEPHONE (OUTPATIENT)
Dept: DERMATOLOGY | Age: 50
End: 2020-08-14

## 2020-08-14 NOTE — TELEPHONE ENCOUNTER
----- Message from Noe Fong MD sent at 8/11/2020  9:38 AM EDT -----  -Midline upper chest - Schedule curettage     -Upper back, L of midline - Atypical mole. Excised via biopsy, so no treatment needed. Atypical moles are risk factors for melanoma.

## 2020-08-14 NOTE — TELEPHONE ENCOUNTER
Lm for patient to [facility] call for biopsy results. Will inform her:    1.A. MIDLINE UPPER CHEST-   Basal Cell Carcinoma, Superficial Type  Schedule curettage (KW, will offer 9-3)    B. UPPER BACK LEFT OF MIDLINE-   Dysplastic nevus with moderate dysplasia, completely   Excised  Atypical mole. Excised via biopsy, so no treatment needed.  Atypical moles are risk factors for melanoma

## 2020-08-18 ENCOUNTER — OFFICE VISIT (OUTPATIENT)
Dept: ORTHOPEDIC SURGERY | Age: 50
End: 2020-08-18
Payer: COMMERCIAL

## 2020-08-18 VITALS — BODY MASS INDEX: 20.15 KG/M2 | HEIGHT: 68 IN | TEMPERATURE: 97.9 F | WEIGHT: 132.94 LBS

## 2020-08-18 PROCEDURE — 99213 OFFICE O/P EST LOW 20 MIN: CPT | Performed by: ORTHOPAEDIC SURGERY

## 2020-08-18 ASSESSMENT — ENCOUNTER SYMPTOMS: ROS SKIN COMMENTS: SKIN CONDITION / CANCER

## 2020-08-18 NOTE — PROGRESS NOTES
Chief Complaint    Follow-up (right knee )    History of Present Illness:  Ilia Junior is a 44 y.o. female with past medical history of a right patellar dislocation who presents for evaluation of her right knee pain. Patient states that she has had an insidious onset of right knee pain beginning back in March. Due to the COVID-19 pandemic the patient had not been able to go to her boxing gym in order to work out where she is active 5-6 days a week. As a replacement regimen for this the patient began running 4 miles 5 to 6 days a week. Patient notes that she did not have a training regimen or a protocol for the progressive increase in the distance of her running. She notes that she does not usually run on a regular basis prior to March. At her last visit in May, she noted majority of her pain is patellofemoral and in the anterior joint line of the knee. At her visit in May, she received a corticosteroid injection and it was determined that she was peeling pain related to possible inflammation within the patellar tendon, infrapatellar fat pad as well as some mild aggravation of pre-existing osteoarthritis within the knee joint. Following this visit, she reduced her activity levels but did not feel any significant decrease in her pain. Since then she states that the pain has locaalized to the medial side of her knee. She denies any single event or injury that she can attribute to the onset or worsening of her knee pain. She denies any mechanical symptoms or sensations of instability. The patient denies any catching, giving way, joint locking, numbness, paresthesias, or weakness. Pain Assessment  Location of Pain: Knee  Location Modifiers: Right  Quality of Pain: Dull  Duration of Pain: Persistent  Frequency of Pain: Constant  Aggravating Factors:  Other (Comment)(running)  Relieving Factors: (n/a)  Result of Injury: No  Work-Related Injury: No  Are there other pain locations you wish to document?: No Comment: 2-3 times per week    Drug use: Never    Sexual activity: Yes     Partners: Male   Lifestyle    Physical activity     Days per week: None     Minutes per session: None    Stress: None   Relationships    Social connections     Talks on phone: None     Gets together: None     Attends Buddhism service: None     Active member of club or organization: None     Attends meetings of clubs or organizations: None     Relationship status: None    Intimate partner violence     Fear of current or ex partner: None     Emotionally abused: None     Physically abused: None     Forced sexual activity: None   Other Topics Concern    None   Social History Narrative    None       Current Outpatient Medications   Medication Sig Dispense Refill    mesalamine (LIALDA) 1.2 g EC tablet Take 1 tablet by mouth daily  11    progesterone (PROMETRIUM) 200 MG capsule Take 200 mg by mouth daily      buPROPion (WELLBUTRIN XL) 150 MG extended release tablet Take 150 mg by mouth       No current facility-administered medications for this visit. Allergies   Allergen Reactions    Sulfa Antibiotics Hives and Rash    Sulfamethoxazole-Trimethoprim Hives and Rash       Review of Systems:  A 14 point review of systems was completed by the patient and is available in the media section of the scanned medical record and was reviewed on 8/18/2020. The review is negative with the exception of those things mentioned in the HPI and Past Medical History     Vital Signs:   Temp 97.9 °F (36.6 °C) (Infrared)   Ht 5' 7.99\" (1.727 m)   Wt 132 lb 15 oz (60.3 kg)   BMI 20.22 kg/m²     General Exam:   Mental Status: The patient is oriented to time, place and person. The patient's mood and affect are appropriate. Neurological: The patient has good coordination. There is no weakness or sensory deficit. Knee Examination: Right     Inspection:   No effusion, ecchymosis, discoloration, erythema, excessive warmth.  no gross deformities, no signs of infection.      Palpation: There is mild patellofemoral crepitus, there is anteromedial and anterolateral joint line tenderness. Tenderness of the patellar tendon and infrapatellar fat pad. No other osseous or soft tissue tenderness around the knee. Negative calf tenderness     Range of Motion:   0-130 degrees with mild discomfort over the infrapatellar fat pad patellar tendon at terminal flexion. Patient exhibits increased patellar mobility extending 1 cm into the second quadrant.     Strength:  5/5 quadriceps, 5/5 hamstrings     Special Tests:  No ligament instability, valgus and varus stress test are stable at 0 and 30°. Lachman's exhibits a solid endpoint. Negative posterior drawer. Negative Homans sign     Gait:  Steady, Non antalgic, without the use of assistive devices     Alignment: Neutral     Neuro: Sensation equal bilateral lower extremities     Vascular: 2+ posterior tibialis pulse    Radiology:     None      Office Procedures:  No orders of the defined types were placed in this encounter. Assessment: Cristal Estrada is a 44 y.o. female who presents for follow up of right knee(s). Encounter Diagnoses   Name Primary?  Primary osteoarthritis of right knee     Right knee pain, unspecified chronicity Yes       Patient's workup and evaluation were reviewed with the patient in the office today. Imaging was also reviewed with the patient in the clinic today. Given the patient's history and physical exam the next step would be to obtain an MRI to assess for meniscal pathology or a plica. We will see the patient back for reevaluation once she has obtained an MRI. She was counseled on appropriate progressive regimen as to not make her knee symptoms worse. She is a very     Treatment Plan:    1. Activity modification and rest  2.  Ice 20 minutes every 1-2 hours PRN  3. NSAIDs PRN  4. Elevation at least 2 inches above the heart with pillows supporting the joint underneath for

## 2020-08-18 NOTE — PROGRESS NOTES
Review of Systems   Skin:        Skin condition / cancer    All other systems reviewed and are negative.

## 2020-08-19 ENCOUNTER — TELEPHONE (OUTPATIENT)
Dept: ORTHOPEDIC SURGERY | Age: 50
End: 2020-08-19

## 2020-08-25 ENCOUNTER — OFFICE VISIT (OUTPATIENT)
Dept: ORTHOPEDIC SURGERY | Age: 50
End: 2020-08-25
Payer: COMMERCIAL

## 2020-08-25 ENCOUNTER — HOSPITAL ENCOUNTER (OUTPATIENT)
Dept: MRI IMAGING | Age: 50
Discharge: HOME | End: 2020-08-25
Payer: COMMERCIAL

## 2020-08-25 VITALS — BODY MASS INDEX: 20.15 KG/M2 | WEIGHT: 132.94 LBS | HEIGHT: 68 IN | TEMPERATURE: 98.5 F

## 2020-08-25 PROCEDURE — 73721 MRI JNT OF LWR EXTRE W/O DYE: CPT

## 2020-08-25 PROCEDURE — 99213 OFFICE O/P EST LOW 20 MIN: CPT | Performed by: ORTHOPAEDIC SURGERY

## 2020-08-25 NOTE — PROGRESS NOTES
Chief Complaint    Follow-up (right knee, MRI results )      History of Present Illness:  Raiza Thomason is a 44 y.o. female who presents for follow up of MRI results of her right knee(s). To recap, the patient states that she has had an insidious onset of right knee pain beginning back in March.  Due to the COVID-19 pandemic the patient had not been able to go to her boxing gym in order to work out where she is active 5-6 days a week.  As a replacement regimen for this the patient began running 4 miles 5 to 6 days a week.  Patient notes that she did not have a training regimen or a protocol for the progressive increase in the distance of her running.  She notes that she does not usually run on a regular basis prior to March.  At her visit in May, she noted majority of her pain is patellofemoral and in the anterior joint line of the knee. At her visit in May, she received a corticosteroid injection and it was determined that she was peeling pain related to possible inflammation within the patellar tendon, infrapatellar fat pad as well as some mild aggravation of pre-existing osteoarthritis within the knee joint. Following this visit, she reduced her activity levels but did not feel any significant decrease in her pain. Since then she states that the pain has locaalized to the medial side of her knee. She denies any single event or injury that she can attribute to the onset or worsening of her knee pain. We ordered an MRI to rule out any acute ligamentous or meniscal damage. She is in office today to review these results. Since her last visit she has decreased her load (no lunging and squatting) and is feeling much better. She denies any mechanical symptoms or sensations of instability.  The patient denies any catching, giving way, joint locking, numbness, paresthesias, or weakness.      Pain Assessment:  Location: right  Level: 4 out of 10  Duration: Months  Result of an injury: No  Work related injury: No  Aggravating actions: lunges and squats  Relieving Factors: rest  Wants injections: Yes     Past Medical History:   Diagnosis Date    Abnormal Pap smear of cervix     Anxiety     Autoimmune disorder (HCC)     Crohn disease (HCC)     Depression     Menopausal symptoms     Scoliosis         Past Surgical History:   Procedure Laterality Date    BACK SURGERY      jia placed for scoliosis age 14    COLONOSCOPY      Every 2-3 years    COLONOSCOPY N/A 7/23/2020    COLONOSCOPY WITH BIOPSY performed by Dennis Bartlett MD at [facility] N/A 7/23/2020    [address] performed by Dennis Bartlett MD at [facility]      TONSILLECTOMY      UPPER GASTROINTESTINAL ENDOSCOPY N/A 7/23/2020    EGD BIOPSY performed by Dennis Bartlett MD at [facility] ENDOSCOPY       Family History   Problem Relation Age of Onset    Heart Disease Paternal Grandfather     Heart Attack Paternal Grandfather     Thyroid Disease Paternal Grandmother     No Known Problems Maternal Grandfather     Heart Disease Father     Cancer Father         skin    Rheum Arthritis Mother     No Known Problems Brother     Other Sister         chrones    No Known Problems Brother     No Known Problems Brother     Other Sister         chrones    Liver Disease Sister         hep c    No Known Problems Brother     No Known Problems Brother     No Known Problems Daughter     No Known Problems Son        Social History     Socioeconomic History    Marital status:      Spouse name: None    Number of children: None    Years of education: None    Highest education level: None   Occupational History    None   Social Needs    Financial resource strain: None    Food insecurity     Worry: None     Inability: None    Transportation needs     Medical: None     Non-medical: None   Tobacco Use    Smoking status: Never Smoker    Smokeless tobacco: Never tricompartmental osteophyte formation. Findings are most pronounced in the patellofemoral joint space. 2. Small joint effusion. AMB REFERRAL TO PHYSICAL THERAPY  SD SYNVISC OR SYNVISC-ONE      Office Procedures:  Orders Placed This Encounter   Procedures    [facility]     Referral Priority:   Routine     Referral Type:   Eval and Treat     Referral Reason:   Specialty Services Required     Requested Specialty:   Physical Therapy     Number of Visits Requested:   1    SYNVISC OR SYNVISC-ONE INJECTION (For Auth/Precert)     Standing Status:   Future     Standing Expiration Date:   8/25/2021            Assessment: Jose Danielson is a 44 y.o. female who presents for follow up of right knee(s). Encounter Diagnosis   Name Primary?  Primary osteoarthritis of right knee Yes     Patient's workup and evaluation were reviewed with the patient in the office today. Imaging was also reviewed with the patient in the clinic today. Given the patient's history and physical exam she is exhibiting signs and symptoms related to patellofemoral arthrosis. Patient was educated on maintaining conservative therapies for her current condition. The non-operative arthritis protocol includes rest, ice, weight control, activity modifications to low impact aerobics (swimming, cycling, elliptical), moderate analgesics (NSAIDS, ice), injections, and physical therapy. She was given the continued precaution of refraining from deep squatting and lunging. She may benefit from a gel injection since she had little benefit from the corticosteroid injection back in May. Lastly, we will refer the patient into physical therapy for a patellofemoral protection program and for some soft tissue work to help release her lateral retinaculum. All the patient's questions were answered while in the clinic.  The patient is understanding of all instructions and agrees with the plan for continued conservative treatment of their osteoarthritis. Treatment Plan:    1. Activity modification and rest  2. Ice 20 minutes every 1-2 hours PRN  3. NSAIDs PRN  4. Elevation at least 2 inches above the heart with pillows supporting the joint underneath for swelling  5. Home exercises to maintain strength and range of motion  6. Physical therapy including Manual Therapy, Strengthening, Stretching, Joint Mobilization and Modalities  7. Gel injection     Diagnoses and treatments were reviewed with the patient today. Patient education material was provided. Questions were entertained and answered to the patient's satisfaction. Follow up: Physical Therapy and back in clinic for gel injection following insurance approval      Mervat HENDRIX ATC am scribing for and in the presence of Dr. Nieves Barney. The physical examination was performed by Dr. Nieves Barney. All counseling during the appointment was performed between the patient and Dr. Nieves Barney. 08/25/20 12:58 PM   Mervat Wright ATC  This dictation was performed with a verbal recognition program (DRAGON) and it was checked for errors. It is possible that there are still dictated errors within this office note. If so, please bring any errors to my attention for an addendum. All efforts were made to ensure that this office note is accurate. Supervising Physician Attestation:  I, Dr. Nieves Barney, personally performed the services described in this documentation as scribed above, and it is both accurate and complete and I agree with all pertinent clinical information. I personally interviewed the patient and performed a physical examination and medical decision making. I discussed the patient's condition and treatment options and have  reviewed and agree with the past medical, family and social history unless otherwise noted. All of the patient's questions were answered.       Board Certified Orthopaedic Surgeon  [address] [place] [address] [facility] and [address] [employer]  Professor of [address]

## 2020-08-27 ENCOUNTER — HOSPITAL ENCOUNTER (OUTPATIENT)
Dept: PHYSICAL THERAPY | Age: 50
Setting detail: THERAPIES SERIES
Discharge: HOME | End: 2020-08-27
Payer: COMMERCIAL

## 2020-08-27 PROCEDURE — 97110 THERAPEUTIC EXERCISES: CPT | Performed by: PHYSICAL THERAPIST

## 2020-08-27 PROCEDURE — 97530 THERAPEUTIC ACTIVITIES: CPT | Performed by: PHYSICAL THERAPIST

## 2020-08-27 PROCEDURE — 97162 PT EVAL MOD COMPLEX 30 MIN: CPT | Performed by: PHYSICAL THERAPIST

## 2020-08-27 NOTE — PLAN OF CARE
The [address] [place] [address] Maxine [address]  [phone]                                                       Physical Therapy Certification    Dear Doris Plascencia MD,    We had the pleasure of evaluating the following patient for physical therapy services at [address] [place] [address]. A summary of our findings can be found in the initial assessment below. This includes our plan of care. If you have any questions or concerns regarding these findings, please do not hesitate to contact me at the office phone number checked above. Thank you for the referral.       Physician Signature:_______________________________Date:__________________  By signing above (or electronic signature), therapists plan is approved by physician      Patient: Marilu Wisdom   : 1970   MRN: [id number]  Referring Physician: Referring Practitioner: Doris Plascencia MD      Evaluation Date: 2020      Medical Diagnosis Information:  Diagnosis: M17.11 (ICD-10-CM) - Primary osteoarthritis of right knee   Treatment Diagnosis: Knee pain/ swelling/ difficulty walking/ limited ROM/ LE muscle weakness/ LE balance deficit                                         Insurance information:  [address] [place] [address] [id number]. [id number] 0COINS  VISITS COMBINED WITH PT/OT NOT [address] [facility] [phone]     Precautions/ Contra-indications: PF protection priniciples    C-SSRS Triggered by Intake questionnaire (Past 2 wk assessment):   [x] No, Questionnaire did not trigger screening.   [] Yes, Patient intake triggered further evaluation      [] C-SSRS Screening completed  [] PCP notified via Plan of Care  [] Emergency services notified     Latex Allergy:  [x]NO      []YES  Preferred Language for Healthcare:   [x]English       []other:    SUBJECTIVE: Marilu Wisdom is a 44 y.o. female who presents for her initial PT assessment after meeting with Dr. Rama See to review her MRI results of her right knee. To recap, the patient states that she has had an insidious onset of right knee pain beginning back in March.  Due to the COVID-19 pandemic the patient had not been able to go to her boxing gym in order to work out where she is active 5-6 days a week.  As a replacement regimen for this, the patient began running 4 miles 5 to 6 days a week.  Patient notes that she did not have a training regimen or a protocol for the progressive increase in the distance of her running. Carmen Casanova notes that she did not usually run on a regular basis prior to [facility] her visit initial Dr. Rama See visit in May, she noted majority of her pain is patellofemoral and in the anterior joint line of the knee. At her visit in May, she received a corticosteroid injection and it was determined that she was feeling pain related to possible inflammation within the patellar tendon, infrapatellar fat pad as well as some mild aggravation of pre-existing osteoarthritis within the knee joint. Following this visit, she reduced her activity levels but did not feel any significant decrease in her pain. Since then she states that the pain has locaalized to the medial side of her knee. She denies any single event or injury that she can attribute to the onset or worsening of her knee pain. Dr. Rama See ordered an MRI to rule out any acute ligamentous or meniscal damage. Since her initial Dr. Rama See visit she has decreased her load (no lunging and squatting) and is feeling much better. She denies any mechanical symptoms or sensations of instability.  The patient denies any catching, giving way, joint locking, numbness, paresthesias, or weakness.      Relevant Medical History: Notes benign general medical history, however, orthopaedic history for surgical treatment for scoliosis/ PRP for elbow pain/ 2 level cervical fusion/ knee OA  Functional Disability Index: LEFS Score: 60/ 80= 75%    Pain record. Patient has been instructed to contact their primary care physician regarding ROS issues if not already being addressed at this time. 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   [x]Prior surgical intervention  []Osteoporosis (M81.8)  []Osteopenia (M85.8)   Endocrine conditions   []Hypothyroid (E03.9)  []Hyperthyroid Gastrointestinal conditions   []Constipation (G53.79)   Metabolic conditions   []Morbid obesity (E66.01)  []Diabetes type 1(E10.65) or 2 (E11.65)   []Neuropathy (G60.9)     Pulmonary conditions   []Asthma (J45)  []Coughing   []COPD (J44.9)   Psychological Disorders  []Anxiety (F41.9)  []Depression (F32.9)   []Other:   [x]Other:     *Scoliosis (surgical intervention)  *Cervical spine 2 levels     Barriers to/and or personal factors that will affect rehab potential:              []Age  []Sex              []Motivation/Lack of Motivation                        [x]Co-Morbidities              []Cognitive Function, education/learning barriers              []Environmental, home barriers              []profession/work barriers  []past PT/medical experience  []other:  Justification: R knee OA; prior L knee patellar subluxation; prior rodding for scoliosis    Falls Risk Assessment (30 days):   [x] Falls Risk assessed and no intervention required.   [] Falls Risk assessed and Patient requires intervention due to being higher risk   TUG score (>12s at risk):     [] Falls education provided, including         ASSESSMENT:   Functional Impairments:     [x]Noted lumbar/proximal hip/LE hypomobility   [x]Decreased LE functional ROM   [x]Decreased core/proximal hip strength and neuromuscular control   [x]Decreased LE functional strength   [x]Reduced balance/proprioceptive control   []other:      Functional Activity Limitations (from functional questionnaire and intake)   [x]Reduced ability to tolerate prolonged functional positions   [x]Reduced ability or difficulty with changes of positions or transfers between positions   [x]Reduced ability to maintain good posture and demonstrate good body mechanics with sitting, bending, and lifting   [x]Reduced ability to sleep   [] Reduced ability or tolerance with driving and/or computer work   [x]Reduced ability to perform lifting, carrying tasks   [x]Reduced ability to squat   [x]Reduced ability to forward bend   [x]Reduced ability to ambulate prolonged functional periods/distances/surfaces   [x]Reduced ability to ascend/descend stairs   [x]Reduced ability to run, hop or jump   []other:     Participation Restrictions   []Reduced participation in self care activities   [x]Reduced participation in home management activities   [x]Reduced participation in work activities   [x]Reduced participation in social activities. [x]Reduced participation in sport activities. Classification :    []Signs/symptoms consistent with post-surgical status including decreased ROM, strength and function.    []Signs/symptoms consistent with joint sprain/strain   [x]Signs/symptoms consistent with patella-femoral syndrome   [x]Signs/symptoms consistent with knee OA/hip OA   []Signs/symptoms consistent with internal derangement of knee/Hip   [x]Signs/symptoms consistent with functional hip weakness/NMR control      []Signs/symptoms consistent with tendinitis/tendinosis    []signs/symptoms consistent with pathology which may benefit from Dry needling      []other:      Prognosis/Rehab Potential:      []Excellent   [x]Good    []Fair   []Poor    Tolerance of evaluation/treatment:    []Excellent   [x]Good: Moderate functional ADL limitations include, but not limited to knee pain/ swelling/ difficulty walking/ limited ROM/ LE muscle weakness/ LE balance deficits    []Fair   []Poor    Physical Therapy Evaluation Complexity Justification  [x] A history of present problem with:  [] no personal factors and/or comorbidities that impact the plan of care;  [x]1-2 personal factors and/or comorbidities that impact the plan of care  []3 personal factors and/or comorbidities that impact the plan of care  [x] An examination of body systems using standardized tests and measures addressing any of the following: body structures and functions (impairments), activity limitations, and/or participation restrictions;:  [] a total of 1-2 or more elements   [x] a total of 3 or more elements   [] a total of 4 or more elements   [x] A clinical presentation with:  [] stable and/or uncomplicated characteristics   [x] evolving clinical presentation with changing characteristics  [] unstable and unpredictable characteristics;   [x] Clinical decision making of [] low, [x] moderate, [] high complexity using standardized patient assessment instrument and/or measurable assessment of functional outcome. [] EVAL (LOW) 56777 (typically 20 minutes face-to-face)  [x] EVAL (MOD) 09009 (typically 30 minutes face-to-face)  [] EVAL (HIGH) 06575 (typically 45 minutes face-to-face)  [] RE-EVAL       PLAN  Frequency/Duration:  1-2 days per week for 12 Weeks:  Interventions:  [x]  Therapeutic exercise including: strength training, ROM, for Lower extremity and core   [x]  NMR activation and proprioception for LE, Glutes and Core   [x]  Manual therapy as indicated for LE, Hip and spine to include: Dry Needling/IASTM, STM, PROM, Gr I-IV mobilizations, manipulation. [x] Modalities as needed that may include: thermal agents, E-stim, Biofeedback, US, iontophoresis as indicated  [x] Patient education on joint protection, postural re-education, activity modification, progression of HEP.     HEP instruction: See attached for HEP/ HTP/ NKI OA precautions located in the Media tab of [facility]    GOALS:   Patient stated goal: Would like to develop leg strength in order to exercise pain free    [] Progressing: [] Met: [] Not Met: [] Adjusted    Therapist goals for Patient:   Short Term Goals: To be achieved in: 2 weeks  1. Independent in HEP and progression per patient tolerance, in order to prevent re-injury. [] Progressing: [] Met: [] Not Met: [] Adjusted   2. Patient will have a decrease in pain to facilitate improvement in movement, function, and ADLs as indicated by Functional Deficits. [] Progressing: [] Met: [] Not Met: [] Adjusted    Long Term Goals: To be achieved in: 12 weeks  1. Disability index score of 15% or less for the LEFS to assist with reaching prior level of function. [] Progressing: [] Met: [] Not Met: [] Adjusted  2. Patient will demonstrate increased AROM to +3-150 deg to allow for proper joint functioning as indicated by patients Functional Deficits. [] Progressing: [] Met: [] Not Met: [] Adjusted  3. Patient will demonstrate an increase in Strength to good proximal hip strength and control, within 5lb HHD; Biodex testing for bilateral/ TQBW/ HQ ratios corrected for age/ sex/ BW in LE to allow for proper functional mobility as indicated by patients Functional Deficits. [] Progressing: [] Met: [] Not Met: [] Adjusted  4. Patient will return to 85%>  functional activities without increased symptoms or restriction. [] Progressing: [] Met: [] Not Met: [] Adjusted  5. Patient will resume a fitness and conditioning program according to [facility] [place] guidelines with OA precautions. [] Progressing: [] Met: [] Not Met: [] Adjusted       Electronically signed by:  Ayana Jorge PT    Note: If patient does not return for scheduled/ recommended follow up visits, this note will serve as a discharge from care along with most recent update on progress.

## 2020-08-27 NOTE — FLOWSHEET NOTE
The [address] [place] [address] [facility]    Physical Therapy Daily Treatment Note  Date:  2020    Patient Name:  Cristal Estrada    :  1970  MRN: [id number]  Restrictions/Precautions:    Medical/Treatment Diagnosis Information:  Diagnosis: M17.11 (ICD-10-CM) - Primary osteoarthritis of right knee  Treatment Diagnosis: Knee pain/ swelling/ difficulty walking/ limited ROM/ LE muscle weakness/ LE balance deficit    Insurance/Certification information:  [address] [place] [address] [phone]. [place] [address]  VISITS COMBINED WITH PT/OT NOT [facility] [phone]  Physician Information:  Orlando Alan MD  Plan of care signed (Y/N): Pending    Date of Patient follow up with Physician: 4-6 weeks; planning for future \"gel\" injection per insurance authorization    G-Code (if applicable):      Date G-Code Applied:  20   LEFS Score: 60/ 80= 75%    Progress Note: [x]  Yes  []  No  Next due by: Visit #10       Latex Allergy:  [x]NO      []YES  Preferred Language for Healthcare:   [x]English       []other:    Visit # Insurance Allowable   1 40 vpcy     Pain Scale: 0 /10 @ rest                    4/ 10 @ worst  Easing factors: Rest; ice; meds  Provocative factors: Sleeping prone; squatting; kneeling; running; stairs   Type: []? Constant       [x]? Intermittent  []? Radiating     []? Localized     []? other:                   SUBJECTIVE: Natalie Abrams a 44 y. o. female who presents for her initial PT assessment after meeting with Dr. Kathya Tirado to review her MRI results of her right knee.  To recap, the patient states that she has had an insidious onset of right knee pain beginning back in March.  Due to the COVID-19 pandemic the patient had not been able to go to her boxing gym in order to work out where she is active 5-6 days a week.  As a replacement regimen for this, the patient began running 4 miles 5 to 6 days a week.  Patient notes that she did not have a training regimen or a protocol for the progressive increase in the distance of her running. Madeleine Prasad notes that she did not usually run on a regular basis prior to [address] Dr. Jeimy Lloyd visit in May, she noted majority of her pain is patellofemoral and in the anterior joint line of the knee. At her visit in May, she received a corticosteroid injection and it was determined that she was feeling pain related to possible inflammation within the patellar tendon, infrapatellar fat pad as well as some mild aggravation of pre-existing osteoarthritis within the knee joint. Following this visit, she reduced her activity levels but did not feel any significant decrease in her pain. Since then she states that the pain has locaalized to the medial side of her knee. She denies any single event or injury that she can attribute to the onset or worsening of her knee pain. Dr. New ordered an MRI to rule out any acute ligamentous or meniscal damage. Since her initial Dr. Jeimy Lloyd visit she has decreased her load (no lunging and squatting) and is feeling much better. She denies any mechanical symptoms or sensations of instability.  The patient denies any catching, giving way, joint locking, numbness, paresthesias, or weakness.        OBJECTIVE:      LEFS Score: 60/ 80= 75% (8/27/20;  Initial)     8-27-20  Flexibility L R Comment   Hamstring + +     Gastroc + +     ITB + +     Quad + +                  8-27-20            ROM PROM AROM Overpressure Comment     L R L R L R     Flexion 150 150             Extension +3 +1                                                    8-27-20  Strength L R Comment   Quad 5-/5 4/5 DALIA 0/ 60°   Hamstring 4+/5 4/5     Gastroc         Hip  flexion 4/5 4/5     Hip abd 4-/5 4-/5                            8-27-20  Special Test Results/Comment   Meniscal Click (-)   Crepitus 2/3  40-0° bilateral   Flexion Test (-)   Valgus Laxity (-)   Varus Laxity (-)   Lachmans (-)   Drop Back (-)   Express Scripts (-)   Temperature (-)   LE (+) lateral tilt  (+) J sign  (-) apprehension  (+) tight lateral retinaculum      8-27-20  Girth L R   Mid Patella 34.5 34.1   Suprapatellar 35.0 34.5   5cm above 37.4 36.5   15cm above 48.0 48.8      Reflexes/Sensation:               []?Dermatomes/Myotomes intact               []?Reflexes equal and normal bilaterally              [x]? Other: NT     Joint mobility:               [x]? Normal                       []?Hypo              []?Hyper     Palpation: Nontender; (+) PF compression     Functional Mobility/Transfers: Independent     Posture: Bilateral genu varum; pes planovalgus     Bandages/Dressings/Incisions: NA     Gait: [facility]     Orthopedic Special Tests:        RESTRICTIONS/PRECAUTIONS: PF protection principles    Exercises/Interventions:   Exercise/Equipment Resistance/Repetitions Other comments 8/27/2020   Stretching      Hamstring 30\"x 5  x   Hip Flexion      ITB      Grion      Quad      Inclined Calf 30\"x 5  x   Towel Pull            SLR      Supine      Prone      Abduction      Adducton      SLR+ 5-10x 15/ 45\" x         Isometrics      Quad sets            Patellar Glides      Medial      Superior      Inferior            ROM      Passive      Active      Weight Shift      Hang Weights      Sheet Pulls      Ankle Pumps            CKC      Calf raises 3x 10 SL x   Wall sits      Step ups      1 leg stand      Squatting      CC TKE      Balance            PRE      Extension  RANGE:    Flexion  RANGE:          Cable Column            Leg Press  RANGE:          Bike      Treadmill            Sitting clams 3x 10 Blue x   Sidelying clams 3x 10 each Blue; #1 x                       Other Therapeutic Activities:   PF protection principles  PowerStep OTC arch support system    Home Exercise Program:   See above and attached. Initial HEP discussed and completed. Full written, verbal, and demonstration provided. Patient Education:      Full conservative instructions provided.  Written and verbal guidelines provided for but not limited to: DME/ HEP/ ICE/ gait/ general medical instructions. Manual Treatments:       Therapeutic Exercise and NMR EXR  [x] (58083) Provided verbal/tactile cueing for activities related to strengthening, flexibility, endurance, ROM for improvements in LE, proximal hip, and core control with self care, mobility, lifting, ambulation. [x] (17400) Provided verbal/tactile cueing for activities related to improving balance, coordination, kinesthetic sense, posture, motor skill, proprioception  to assist with LE, proximal hip, and core control in self care, mobility, lifting, ambulation and eccentric single leg control.      NMR and Therapeutic Activities:    [x] (27942 or 81374) Provided verbal/tactile cueing for activities related to improving balance, coordination, kinesthetic sense, posture, motor skill, proprioception and motor activation to allow for proper function of core, proximal hip and LE with self care and ADLs  [] (45740) Gait Re-education- Provided training and instruction to the patient for proper LE, core and proximal hip recruitment and positioning and eccentric body weight control with ambulation re-education including up and down stairs     Home Exercise Program:    [x] (30640) Reviewed/Progressed HEP activities related to strengthening, flexibility, endurance, ROM of core, proximal hip and LE for functional self-care, mobility, lifting and ambulation/stair navigation   [x] (71245)Reviewed/Progressed HEP activities related to improving balance, coordination, kinesthetic sense, posture, motor skill, proprioception of core, proximal hip and LE for self care, mobility, lifting, and ambulation/stair navigation      Manual Treatments:  PROM / STM / Oscillations-Mobs:  G-I, II, III, IV (PA's, Inf., Post.)  [x] (21551) Provided manual therapy to mobilize LE, proximal hip and/or LS spine soft tissue/joints for the purpose of modulating pain, promoting relaxation, increasing ROM, reducing/eliminating soft tissue swelling/inflammation/restriction, improving soft tissue extensibility and allowing for proper ROM for normal function with self care, mobility, lifting and ambulation. Modalities:    [] hot packs  [] EMS   [] Ultrasound  [x] ice   [] vasopneumatic  [] high volt/EGS  [] phono  [] tens    [] ionto  [] autorange/biodex [] Interferential  [] other    Charges:  Timed Code Treatment Minutes: 30'   Total Treatment Minutes: 61'     [x] EVAL: L2  [x] MARTINE(85484) x  1   [] IONTO  [] NMR (21430) x      [] VASO  [] Manual (12130) x       [] Other:  [x] TA x       [] Mech Traction (20700)  [] ES(attended) (56974)      [] ES (un) (19822):     GOALS:   Patient stated goal: Would like to develop leg strength in order to exercise pain free    []? Progressing: []? Met: []? Not Met: []? Adjusted     Therapist goals for Patient:   Short Term Goals: To be achieved in: 2 weeks  1. Independent in HEP and progression per patient tolerance, in order to prevent re-injury. []? Progressing: []? Met: []? Not Met: []? Adjusted   2. Patient will have a decrease in pain to facilitate improvement in movement, function, and ADLs as indicated by Functional Deficits. []? Progressing: []? Met: []? Not Met: []? Adjusted     Long Term Goals: To be achieved in: 12 weeks  1. Disability index score of 15% or less for the LEFS to assist with reaching prior level of function. []? Progressing: []? Met: []? Not Met: []? Adjusted  2. Patient will demonstrate increased AROM to +3-150 deg to allow for proper joint functioning as indicated by patients Functional Deficits. []? Progressing: []? Met: []? Not Met: []? Adjusted  3. Patient will demonstrate an increase in Strength to good proximal hip strength and control, within 5lb HHD; Biodex testing for bilateral/ TQBW/ HQ ratios corrected for age/ sex/ BW in LE to allow for proper functional mobility as indicated by patients Functional Deficits.    []? Progressing: []? Met: []? Not Met: []? Adjusted  4. Patient will return to 85%>  functional activities without increased symptoms or restriction. []? Progressing: []? Met: []? Not Met: []? Adjusted  5. Patient will resume a fitness and conditioning program according to [facility] with OA precautions. []? Progressing: []? Met: []? Not Met: []? Adjusted         Progression Towards Functional goals:  [] Patient is progressing as expected towards functional goals listed. [] Progression is slowed due to complexities listed. [] Progression has been slowed due to co-morbidities. [x] Plan just implemented, too soon to assess goals progression  [] Other:     ASSESSMENT:   Functional Impairments:                [x]? Noted lumbar/proximal hip/LE hypomobility              [x]? Decreased LE functional ROM              [x]? Decreased core/proximal hip strength and neuromuscular control              [x]? Decreased LE functional strength   [x]? Reduced balance/proprioceptive control              []?other:       Functional Activity Limitations (from functional questionnaire and intake)              [x]? Reduced ability to tolerate prolonged functional positions              [x]? Reduced ability or difficulty with changes of positions or transfers between positions              [x]? Reduced ability to maintain good posture and demonstrate good body mechanics with sitting, bending, and lifting              [x]? Reduced ability to sleep              []? Reduced ability or tolerance with driving and/or computer work              [x]? Reduced ability to perform lifting, carrying tasks              [x]? Reduced ability to squat              [x]? Reduced ability to forward bend              [x]? Reduced ability to ambulate prolonged functional periods/distances/surfaces              [x]? Reduced ability to ascend/descend stairs              [x]? Reduced ability to run, hop or jump              []?other:     Participation Restrictions []?Reduced participation in self care activities              [x]? Reduced participation in home management activities              [x]? Reduced participation in work activities              [x]? Reduced participation in social activities. [x]? Reduced participation in sport activities.     Classification :               []?Signs/symptoms consistent with post-surgical status including decreased ROM, strength and function.              []?Signs/symptoms consistent with joint sprain/strain              [x]? Signs/symptoms consistent with patella-femoral syndrome              [x]? Signs/symptoms consistent with knee OA/hip OA              []?Signs/symptoms consistent with internal derangement of knee/Hip              [x]? Signs/symptoms consistent with functional hip weakness/NMR control                 []?Signs/symptoms consistent with tendinitis/tendinosis                      []?signs/symptoms consistent with pathology which may benefit from Dry needling                       []?other:       Prognosis/Rehab Potential:                                       []?Excellent              [x]? Good                         []?Fair              []?Poor     Tolerance of evaluation/treatment:   [x] Patient tolerated treatment well [] Patient limited by fatique  [] Patient limited by pain  [] Patient limited by other medical complications  [x] Other: Moderate functional ADL limitations include, but not limited to knee pain/ swelling/ difficulty walking/ limited ROM/ LE muscle weakness/ LE balance deficits     Prognosis: [x] Good [] Fair  [] Poor    Patient Requires Follow-up: [x] Yes  [] No    PLAN:   [] Continue per plan of care [] Alter current plan (see comments)  [x] Plan of care initiated [] Hold pending MD visit [] Discharge  Frequency/Duration:  1-2 days per week for 12 Weeks:  Interventions:      Electronically signed by: Jerel Adam PT     Note: If patient does not return for scheduled/ recommended follow up visits, this note will serve as a discharge from care along with most recent update on progress.

## 2020-09-02 NOTE — PATIENT INSTRUCTIONS
Curettage Wound Care Instructions     Keep the bandage in place for 24 hours.  Cleanse the wound with mild soapy water daily.  Gently dry the area.  Apply Vaseline or petroleum jelly to the wound using a cotton tipped applicator.  Cover with a clean bandage.  Repeat this process until the curettage site is healed.    You may shower and bathe as usual.

## 2020-09-02 NOTE — PROGRESS NOTES
[facility]) Dermatology  Radha Galvan MD  [phone]      Amisha Dao  1970    44 y.o. female     Date of Visit: 9/3/2020    Chief Complaint: [address]    History of Present Illness:  1. Pt is here for treatment of superficial BCC on midline upper chest. No concerns since biopsy. Review of Systems: None     Past Medical History, Medications and Allergies reviewed. Past Medical History:   Diagnosis Date    Abnormal Pap smear of cervix     Anxiety     Autoimmune disorder (HCC)     Crohn disease ([facility]     Depression     Menopausal symptoms     Scoliosis      Past Surgical History:   Procedure Laterality Date    BACK SURGERY      jia placed for scoliosis age 14    COLONOSCOPY      Every 2-3 years    COLONOSCOPY N/A 7/23/2020    COLONOSCOPY WITH BIOPSY performed by Beverly Mix MD at [address]  N/A 7/23/2020    [address] performed by Beverly Mix MD at [facility].      UPPER GASTROINTESTINAL ENDOSCOPY N/A 7/23/2020    EGD BIOPSY performed by Beverly Mix MD at [address] [facility]   Allergen Reactions    Sulfa Antibiotics Hives and Rash    Sulfamethoxazole-Trimethoprim Hives and Rash     Outpatient Medications Marked as Taking for the 9/3/20 encounter (Procedure visit) with Radha Galvan MD   Medication Sig Dispense Refill    mesalamine (LIALDA) 1.2 g EC tablet Take 1 tablet by mouth daily  11    progesterone (PROMETRIUM) 200 MG capsule Take 200 mg by mouth daily      buPROPion (WELLBUTRIN XL) 150 MG extended release tablet Take 150 mg by mouth          Physical Examination     -General: Well-appearing, NAD  1. Midline upper chest - 1.5 round shallow ulcer     Assessment and Plan     1.  Superficial BCC, midline upper chest   -Informed written consent obtained after risks (bleeding, infection, scar, discomfort) and benefits explained.   -Local anesthesia acheived with 1% lidocaine with epinephrine/sodium bicarbonate. Sharp curettage performed in 3 different directions with 3-4mm margins. Final lesion size 2.2cm. Hemostasis obtained with aluminum chloride.   -Edu re: bleeding, discomfort, infection, scar  -Edu re: wound care, risk of recurrence     F/u 6 mo for FSE, sooner prn.

## 2020-09-03 ENCOUNTER — PROCEDURE VISIT (OUTPATIENT)
Dept: DERMATOLOGY | Age: 50
End: 2020-09-03
Payer: COMMERCIAL

## 2020-09-03 VITALS — TEMPERATURE: 97.7 F

## 2020-09-03 PROCEDURE — 17263 DSTRJ MAL LES T/A/L 2.1-3.0: CPT | Performed by: DERMATOLOGY

## 2020-09-04 ENCOUNTER — HOSPITAL ENCOUNTER (OUTPATIENT)
Dept: PHYSICAL THERAPY | Age: 50
Setting detail: THERAPIES SERIES
Discharge: HOME | End: 2020-09-04
Payer: COMMERCIAL

## 2020-09-04 PROCEDURE — 97110 THERAPEUTIC EXERCISES: CPT | Performed by: PHYSICAL THERAPIST

## 2020-09-04 PROCEDURE — 97530 THERAPEUTIC ACTIVITIES: CPT | Performed by: PHYSICAL THERAPIST

## 2020-09-04 PROCEDURE — 97112 NEUROMUSCULAR REEDUCATION: CPT | Performed by: PHYSICAL THERAPIST

## 2020-09-04 NOTE — FLOWSHEET NOTE
[facility] [address] [place] [address] [facility]    [facility] Therapy Daily Treatment Note  Date:  2020    Patient Name:  River Mei    :  1970  MRN: [id number]  Restrictions/Precautions:    Medical/Treatment Diagnosis Information:  Diagnosis: M17.11 (ICD-10-CM) - Primary osteoarthritis of right knee  Treatment Diagnosis: Knee pain/ swelling/ difficulty walking/ limited ROM/ LE muscle weakness/ LE balance deficit    Insurance/Certification information:  [address] [place] [address] [id number]. [place] [address]  VISITS COMBINED WITH PT/OT NOT [facility] [phone]  Physician Information:  Kraey Bruner MD  Plan of care signed (Y/N): Pending    Date of Patient follow up with Physician: 4-6 weeks; planning for future \"gel\" injection per insurance authorization    G-Code (if applicable):      Date G-Code Applied:  20   LEFS Score: 60/ 80= 75%    Progress Note: [x]  Yes  []  No  Next due by: Visit #10       Latex Allergy:  [x]NO      []YES  Preferred Language for Healthcare:   [x]English       []other:    Visit # Insurance Allowable   2 40 vpcy     Pain Scale: 0 /10 @ rest                    4/ 10 @ worst  Easing factors: Rest; ice; meds  Provocative factors: Sleeping prone; squatting; kneeling; running; stairs   Type: []? Constant       [x]? Intermittent  []? Radiating     []? Localized     []? other:                   SUBJECTIVE:   Played golf yesterday with increased soreness during and after. Overall, feels a little bit better with controlling squatting/ lunging/ rotation. Previous spinal surgery creates limitation for certain core exercises. Jose Monzon is a 44 y. o. female who presents for her initial PT assessment after meeting with Dr. Boo Bass to review her MRI results of her right knee.  To recap, the patient states that she has had an insidious onset of right knee pain beginning back in March.  Due to the COVID-19 pandemic the patient had not been able to go to her boxing gym in order to work out where she is active 5-6 days a week.  As a replacement regimen for this, the patient began running 4 miles 5 to 6 days a week.  Patient notes that she did not have a training regimen or a protocol for the progressive increase in the distance of her running.  She notes that she did not usually run on a regular basis prior to [facility] initial Dr. Rama See visit in May, she noted majority of her pain is patellofemoral and in the anterior joint line of the knee. At her visit in May, she received a corticosteroid injection and it was determined that she was feeling pain related to possible inflammation within the patellar tendon, infrapatellar fat pad as well as some mild aggravation of pre-existing osteoarthritis within the knee joint. Following this visit, she reduced her activity levels but did not feel any significant decrease in her pain. Since then she states that the pain has locaalized to the medial side of her knee. She denies any single event or injury that she can attribute to the onset or worsening of her knee pain. Dr. New ordered an MRI to rule out any acute ligamentous or meniscal damage. Since her initial Dr. Rama See visit she has decreased her load (no lunging and squatting) and is feeling much better. She denies any mechanical symptoms or sensations of instability.  The patient denies any catching, giving way, joint locking, numbness, paresthesias, or weakness.        OBJECTIVE:      LEFS Score: 60/ 80= 75% (8/27/20;  Initial)     8-27-20  Flexibility L R Comment   Hamstring + +     Gastroc + +     ITB + +     Quad + +                  8-27-20            ROM PROM AROM Overpressure Comment     L R L R L R     Flexion 150 150             Extension +3 +1                                                    8-27-20  Strength L R Comment   Quad 5-/5 4/5 DALIA 0/ 60°   Hamstring 4+/5 4/5     Gastroc         Hip  flexion 4/5 4/5     Hip abd each Blue; #1 x   Bridging 3x 10 PS x                 Other Therapeutic Activities:   PF protection principles  PowerStep OTC arch support system  Uses Foam Roller for soft tissue work/ stretching 2° spinal jia due to previous surgery    Home Exercise Program:   See above and attached. Initial HEP discussed and completed. Full written, verbal, and demonstration provided. Patient Education:      Full conservative instructions provided. Written and verbal guidelines provided for but not limited to: DME/ HEP/ ICE/ gait/ general medical instructions. Manual Treatments:       Therapeutic Exercise and NMR EXR  [x] (50863) Provided verbal/tactile cueing for activities related to strengthening, flexibility, endurance, ROM for improvements in LE, proximal hip, and core control with self care, mobility, lifting, ambulation. [x] (36062) Provided verbal/tactile cueing for activities related to improving balance, coordination, kinesthetic sense, posture, motor skill, proprioception  to assist with LE, proximal hip, and core control in self care, mobility, lifting, ambulation and eccentric single leg control.      NMR and Therapeutic Activities:    [x] (15712 or 31972) Provided verbal/tactile cueing for activities related to improving balance, coordination, kinesthetic sense, posture, motor skill, proprioception and motor activation to allow for proper function of core, proximal hip and LE with self care and ADLs  [] (26058) Gait Re-education- Provided training and instruction to the patient for proper LE, core and proximal hip recruitment and positioning and eccentric body weight control with ambulation re-education including up and down stairs     Home Exercise Program:    [x] (39331) Reviewed/Progressed HEP activities related to strengthening, flexibility, endurance, ROM of core, proximal hip and LE for functional self-care, mobility, lifting and ambulation/stair navigation   [x] (59850)Reviewed/Progressed HEP activities related to improving balance, coordination, kinesthetic sense, posture, motor skill, proprioception of core, proximal hip and LE for self care, mobility, lifting, and ambulation/stair navigation      Manual Treatments:  PROM / STM / Oscillations-Mobs:  G-I, II, III, IV (PA's, Inf., Post.)  [x] (48247) Provided manual therapy to mobilize LE, proximal hip and/or LS spine soft tissue/joints for the purpose of modulating pain, promoting relaxation,  increasing ROM, reducing/eliminating soft tissue swelling/inflammation/restriction, improving soft tissue extensibility and allowing for proper ROM for normal function with self care, mobility, lifting and ambulation. Modalities:    [] hot packs  [] EMS   [] Ultrasound  [x] ice   [] vasopneumatic  [] high volt/EGS  [] phono  [] tens    [] ionto  [] autorange/biodex [] Interferential  [] other    Charges:  Timed Code Treatment Minutes: 50'   Total Treatment Minutes: 50'     [] EVAL: L2  [x] LN(95044) x  1   [] IONTO  [x] NMR (23487) x  1   [] VASO  [] Manual (17598) x       [] Other:  [x] TA x  1    [] Mech Traction (71806)  [] ES(attended) (02596)      [] ES (un) (84195):     GOALS:   Patient stated goal: Would like to develop leg strength in order to exercise pain free    []? Progressing: []? Met: []? Not Met: []? Adjusted     Therapist goals for Patient:   Short Term Goals: To be achieved in: 2 weeks  1. Independent in HEP and progression per patient tolerance, in order to prevent re-injury. []? Progressing: []? Met: []? Not Met: []? Adjusted   2. Patient will have a decrease in pain to facilitate improvement in movement, function, and ADLs as indicated by Functional Deficits. []? Progressing: []? Met: []? Not Met: []? Adjusted     Long Term Goals: To be achieved in: 12 weeks  1. Disability index score of 15% or less for the LEFS to assist with reaching prior level of function. []? Progressing: []? Met: []? Not Met: []? Adjusted  2.  Patient will limited ROM/ LE muscle weakness/ LE balance deficits     Prognosis: [x] Good [] Fair  [] Poor    Patient Requires Follow-up: [x] Yes  [] No    PLAN:   [] Continue per plan of care [] Alter current plan (see comments)  [x] Plan of care initiated [] Hold pending MD visit [] Discharge  Frequency/Duration:  1-2 days per week for 12 Weeks:  Interventions:    Continue PF protection principles  Advance program for balance of [facility] vs [facility]  Balance/ proprioception  Hip control    Electronically signed by: Zari Hemphill PT     Note: If patient does not return for scheduled/ recommended follow up visits, this note will serve as a discharge from care along with most recent update on progress.

## 2020-09-09 ENCOUNTER — TELEPHONE (OUTPATIENT)
Dept: ORTHOPEDIC SURGERY | Age: 50
End: 2020-09-09

## 2020-09-15 ENCOUNTER — OFFICE VISIT (OUTPATIENT)
Dept: ORTHOPEDIC SURGERY | Age: 50
End: 2020-09-15
Payer: COMMERCIAL

## 2020-09-15 VITALS — TEMPERATURE: 98.2 F | BODY MASS INDEX: 20.15 KG/M2 | WEIGHT: 132.94 LBS | HEIGHT: 68 IN

## 2020-09-15 PROCEDURE — 20610 DRAIN/INJ JOINT/BURSA W/O US: CPT | Performed by: ORTHOPAEDIC SURGERY

## 2020-09-15 PROCEDURE — 99999 PR OFFICE/OUTPT VISIT,PROCEDURE ONLY: CPT | Performed by: ORTHOPAEDIC SURGERY

## 2020-09-15 NOTE — PROGRESS NOTES
VISCO SUPPLEMENTATION  INJECTION of the right knee(s). HISTORY OF PRESENT ILLNESS: The patient presents for a viscosuppementation injection. PHYSICAL EXAMINATION: Inspection of the right knee(s) reveals warm, dry, intact skin, and no evidence of infection. There is no effusion. The distal neurovascular exam is grossly intact. PROCEDURE: Risks, benefits, and alternatives to the injections were discussed in detail with the patient. The risks discussed included but are not limited to infection, skin reactions, hot swollen joints, and anaphylaxis. After informed consent was provided, the patient lay supine on the exam table. The superolateral aspect of the right knee(s) was prepped with Chlora-prep. The skin and subcutaneous tissues were anesthetized with ethyl chloride spray and 1% lidocaine injected with a 20-gauge needle. An 18- gauge needle was inserted intra-articularly and Synvisc One 48mg was injected  without resistance. The needle was withdrawn and the puncture site sealed with a Band-Aid. The patient tolerated the procedure well. ASSESSMENT:Patellofemoral Osteoarthritis right knee(s). PLAN: Return in 6 weeks for follow up as needed. Rose Michelle ATC am scribing for and in the presence of Dr. Natalee Dick. 09/15/20 2:55 PM   Jony Sheikh ATC    This dictation was performed with a verbal recognition program [facility] ) and it was checked for errors. It is possible that there are still dictated errors within this office note. If so, please bring any errors to my attention for an addendum. All efforts were made to ensure that this office note is accurate. This dictation was performed with a verbal recognition program (DRAGON) and it was checked for errors. It is possible that there are still dictated errors within this office note. If so, please bring any errors to my attention for an addendum. All efforts were made to ensure that this office note is accurate.     Supervising

## 2020-09-17 ENCOUNTER — HOSPITAL ENCOUNTER (OUTPATIENT)
Dept: WOMENS IMAGING | Age: 50
Discharge: HOME | End: 2020-09-17
Payer: COMMERCIAL

## 2020-09-17 PROCEDURE — 77063 BREAST TOMOSYNTHESIS BI: CPT

## 2020-12-29 ENCOUNTER — OFFICE VISIT (OUTPATIENT)
Dept: FAMILY MEDICINE CLINIC | Age: 50
End: 2020-12-29
Payer: COMMERCIAL

## 2020-12-29 VITALS — BODY MASS INDEX: 21.52 KG/M2 | WEIGHT: 142 LBS | HEIGHT: 68 IN | TEMPERATURE: 97.9 F

## 2020-12-29 PROCEDURE — 99213 OFFICE O/P EST LOW 20 MIN: CPT | Performed by: FAMILY MEDICINE

## 2020-12-29 RX ORDER — AMOXICILLIN AND CLAVULANATE POTASSIUM 875; 125 MG/1; MG/1
1 TABLET, FILM COATED ORAL 2 TIMES DAILY
Qty: 20 TABLET | Refills: 0 | Status: SHIPPED | OUTPATIENT
Start: 2020-12-29 | End: 2021-01-08

## 2020-12-29 NOTE — PROGRESS NOTES
Subjective:      Patient ID: Silvia Aw is a 44 y.o. female. STEVE   Pt is a of 44 y.o. female comes in today with   Chief Complaint   Patient presents with    Congestion     Follows with Dr. Oliver De Jesus for allergies. Was having a lot of sinus problems then. Thought to have covid but couldn't get tested  Put on steroids, antibiotics, advair, and albuterol. Symptoms did resolve    This time having sinus pain/pressure. Started 2-3 weeks ago. Doing rescue inhaler. Past Medical History:Reviewed  Medications:Reviewed. Allergies   Allergen Reactions    Sulfa Antibiotics Hives and Rash    Sulfamethoxazole-Trimethoprim Hives and Rash      Social hx:Reviewed. Social History     Tobacco Use   Smoking Status Never Smoker   Smokeless Tobacco Never Used       Vitals:    12/29/20 1605   Temp: 97.9 °F (36.6 °C)   TempSrc: Temporal   Weight: 142 lb (64.4 kg)   Height: 5' 7.99\" (1.727 m)        Review of Systems   Constitutional: Negative. Objective:   Physical Exam  Constitutional:       Appearance: She is well-developed. HENT:      Right Ear: Tympanic membrane, ear canal and external ear normal.      Left Ear: Tympanic membrane, ear canal and external ear normal.      Mouth/Throat:      Pharynx: No oropharyngeal exudate. Eyes:      General: No scleral icterus. Conjunctiva/sclera: Conjunctivae normal.   Neck:      Musculoskeletal: Neck supple. Cardiovascular:      Rate and Rhythm: Normal rate and regular rhythm. Heart sounds: Normal heart sounds. No murmur. Pulmonary:      Effort: Pulmonary effort is normal. No respiratory distress. Breath sounds: No wheezing or rales. Lymphadenopathy:      Head:      Right side of head: No submandibular or preauricular adenopathy. Left side of head: No submandibular or preauricular adenopathy. Cervical: No cervical adenopathy. Skin:     General: Skin is warm and dry. Findings: No erythema. Nails: There is no clubbing. Neurological:      Mental Status: She is alert and oriented to person, place, and time. Cranial Nerves: No cranial nerve deficit. Psychiatric:         Behavior: Behavior normal.         Assessment:       Diagnosis Orders   1. Acute bacterial sinusitis            Plan:      Covering with antibiotics  Advised to call back directly if there are further questions, or if these symptoms fail to improve as anticipated or worsen.          Luisa Thompson MD

## 2021-01-29 ENCOUNTER — NURSE TRIAGE (OUTPATIENT)
Dept: OTHER | Facility: CLINIC | Age: 51
End: 2021-01-29

## 2021-01-29 NOTE — TELEPHONE ENCOUNTER
Reason for Disposition   Chest pain or pressure    Additional Information   Negative: SEVERE difficulty breathing (e.g., struggling for each breath, speaks in single words)     Pt states she doesn't know if she SOB, though endorsing earlier in assessment. States she feels like there is something in her chest.    Answer Assessment - Initial Assessment Questions  1. COVID-19 DIAGNOSIS: \"Who made your Coronavirus (COVID-19) diagnosis? \" \"Was it confirmed by a positive lab test?\" If not diagnosed by a HCP, ask \"Are there lots of cases (community spread) where you live? \" (See public health department website, if unsure)    [place]. Was confirmed by positive lab test.        2. COVID-19 EXPOSURE: \"Was there any known exposure to COVID before the symptoms began? \" CDC Definition of close contact: within 6 feet (2 meters) for a total of 15 minutes or more over a 24-hour period. Denies. 3. ONSET: \"When did the COVID-19 symptoms start?\"       1/19 for all symptoms but the cough has worsened 1/28 and can no longer hold her breath. States she feels like there is something in chest.    4. WORST SYMPTOM: \"What is your worst symptom? \" (e.g., cough, fever, shortness of breath, muscle aches)      Endorses SOB, but has been using her son's inhaler. Cough is the worst symptom because she has been managing the SOB with inhaler and prednisone. 5. COUGH: \"Do you have a cough? \" If so, ask: \"How bad is the cough? \"        Yes, cough is dry and non-productive. She cannot cough up what is bothering her. 6. FEVER: \"Do you have a fever? \" If so, ask: \"What is your temperature, how was it measured, and when did it start? \"      Denies. 7. RESPIRATORY STATUS: \"Describe your breathing? \" (e.g., shortness of breath, wheezing, unable to speak)       States she can longer hold her breath, doesn't hear a wheeze but states she could describe it as wheezing.  Denies difficulty breathing, but cannot take a full breathe without coughing. States she doesn't know if she is SOB and that it \"just feels something is in her chest.\"    8. BETTER-SAME-WORSE: Shirley Bowden you getting better, staying the same or getting worse compared to yesterday? \"  If getting worse, ask, \"In what way? \"      Getting worse. This cough is getting worse. 9. HIGH RISK DISEASE: \"Do you have any chronic medical problems? \" (e.g., asthma, heart or lung disease, weak immune system, obesity, etc.)      Crohn's disease, allergies that she gets shots for. 10. PREGNANCY: \"Is there any chance you are pregnant? \" \"When was your last menstrual period? \"        Denies. 11. OTHER SYMPTOMS: \"Do you have any other symptoms? \"  (e.g., chills, fatigue, headache, loss of smell or taste, muscle pain, sore throat; new loss of smell or taste especially support the diagnosis of COVID-19)        Headache, lost smell and taste. Protocols used: CORONAVIRUS (NUOAE-55) DIAGNOSED OR SUSPECTED-ADULT-AH    Patient called Mariana Hernandez at [facility])  with red flag complaint. Brief description of triage: COVID, cough worsening, see above. Triage indicates for patient to ED or PCP triage. Writer called Jolie Giles NP to explain that Pt does not know if she is SOB, feels chest pain and pressure with cough, unable to cough up what is in her chest, and has been self-medicating with son's inhaler and prednisone. NP stated that Nereida Cochran MD is not a red clinic and she needs a HCP to listen to her lungs. NP directed the Pt to the [facility] who tested her, any C, or ER Today. Writer explained this Pt, emphasizing she needs to be seen today by a HCP at [facility] or ER. Care advice provided, patient verbalizes understanding; denies any other questions or concerns; instructed to call back for any new or worsening symptoms. Attention Provider: Thank you for allowing me to participate in the care of your patient.   The patient was connected to triage in response to information provided to the E[facility].  Please do not respond through this encounter as the response is not directed to a shared pool.

## 2021-03-15 ENCOUNTER — OFFICE VISIT (OUTPATIENT)
Dept: OBGYN CLINIC | Age: 51
End: 2021-03-15
Payer: COMMERCIAL

## 2021-03-15 VITALS
WEIGHT: 140 LBS | TEMPERATURE: 98.3 F | SYSTOLIC BLOOD PRESSURE: 112 MMHG | HEART RATE: 86 BPM | BODY MASS INDEX: 21.29 KG/M2 | DIASTOLIC BLOOD PRESSURE: 66 MMHG

## 2021-03-15 DIAGNOSIS — Z79.890 ON HORMONE REPLACEMENT THERAPY: ICD-10-CM

## 2021-03-15 DIAGNOSIS — Z01.419 ENCNTR FOR GYN EXAM (GENERAL) (ROUTINE) W/O ABN FINDINGS: Primary | ICD-10-CM

## 2021-03-15 DIAGNOSIS — Z12.4 PAP SMEAR FOR CERVICAL CANCER SCREENING: ICD-10-CM

## 2021-03-15 PROCEDURE — 99396 PREV VISIT EST AGE 40-64: CPT | Performed by: OBSTETRICS & GYNECOLOGY

## 2021-03-15 NOTE — PROGRESS NOTES
Annual Exam      CC:   Chief Complaint   Patient presents with    Gynecologic Exam       HPI:  46 y.o. [id number] presents for her gynecologicannual exam.    Patient seen and examined. Patient is overall doing well. Patient is doing well. Reports she has done well, had COVID in 2020. Last pap smear 10/2019, NILM with negative HPV. Had endometrial ablation at age 34. Has not had bleeding since that time. Hot flashes managed with hormone pellets for 4.5 years. Is doing well and follows closely with provider. Health Maintenance:  Birth control: Partner has a vasectomy  Pregnancy plans: None  Safe relationship:  28 years    Screening:  Last pap smear: 2019 - NILM, neg HPV  History of abnormal pap smears: In college she had an abnormal, has been normal since that time   Mammogram: 2020 - Birads 1, dense breasts   Colonoscopy: 2020 - Repeat in 3 years due to Crohns    Vaccines:  Flu vaccine: Has had     Review of Systems:   Review of Systems   Constitutional: Negative for chills and fever. HENT: Negative for congestion and sore throat. Respiratory: Negative for cough, chest tightness and shortness of breath. Cardiovascular: Negative for chest pain and palpitations. Gastrointestinal: Negative for abdominal pain, constipation, diarrhea, nausea and vomiting. Endocrine:        Hot flashes managed with pellets   Genitourinary: Positive for dyspareunia (improved with pellets). Negative for dysuria, frequency, menstrual problem, pelvic pain and vaginal discharge. Musculoskeletal: Negative. Neurological: Negative for dizziness and headaches. All other systems reviewed and are negative.       Primary Care Physician: Angela Tello MD    Obstetric History  OB History    Para Term  AB Living   2 2 2     2   SAB TAB Ectopic Molar Multiple Live Births             2      # Outcome Date GA Lbr Juan Pablo/2nd Weight Sex Delivery Anes PTL Lv   2 Term personal/family history of cervical, uterine, ovarian, vulvar, breast, or colon cancers. Denies personal/family history of bleeding or clotting disorders  Denies personal/family history of genetic disorders    Social History:  Social History     Socioeconomic History    Marital status:      Spouse name: None    Number of children: None    Years of education: None    Highest education level: None   Occupational History    None   Social Needs    Financial resource strain: None    Food insecurity     Worry: None     Inability: None    Transportation needs     Medical: None     Non-medical: None   Tobacco Use    Smoking status: Never Smoker    Smokeless tobacco: Never Used   Substance and Sexual Activity    Alcohol use: Yes     Frequency: 2-3 times a week     Comment: 2-3 times per week    Drug use: Never    Sexual activity: Yes     Partners: Male   Lifestyle    Physical activity     Days per week: None     Minutes per session: None    Stress: None   Relationships    Social connections     Talks on phone: None     Gets together: None     Attends Judaism service: None     Active member of club or organization: None     Attends meetings of clubs or organizations: None     Relationship status: None    Intimate partner violence     Fear of current or ex partner: None     Emotionally abused: None     Physically abused: None     Forced sexual activity: None   Other Topics Concern    None   Social History Narrative    None       Objective:  /66 (Site: Left Upper Arm, Position: Sitting, Cuff Size: Medium Adult)   Pulse 86   Temp 98.3 °F (36.8 °C) (Infrared)   Wt 140 lb (63.5 kg)   BMI 21.29 kg/m²     Exam:   Physical Exam  Vitals signs reviewed. Exam conducted with a chaperone present. Constitutional:       General: She is not in acute distress. Appearance: She is well-developed. HENT:      Head: Normocephalic and atraumatic.    Eyes:      Extraocular Movements: Extraocular - Will follow-up in 1 year for annual exam     2. Pap smear for cervical cancer screening     - Pap smear collected today - will call with results     - Age based screening recommendations discussed    3.  On hormone replacement therapy     - On estrogen/progeterone/testosterone pellets x4 years     - Doing well without significant side effects     - Follows closely with provider     - Reviewed risks, benefits     - Will continue to follow as needed       Ayan Armenta,

## 2021-03-25 NOTE — PROGRESS NOTES
[facility]) Dermatology  Tyler Hinton MD  [phone]      [place]  1970    46 y.o. female     Date of Visit: 3/30/2021    Last Visit: 6mo     Chief Complaint: Skin check    History of Present Illness:  1. Here for skin check. Hx NMSC - sBCC midline upper chest s/p curettage 9/2020  -Likes to spend time in sun. Just returned from [facility]. Uses SPF 30 sunscreen regularly but tans easily     2. Here for mole check. No new moles. No moles changing in size, shape, color. None associated w/ pain, bleeding, pruritus.    -Hx moderately dysplastic nevus, L upper back - excised via biopsy 8/2020    3. Would like rough lesions on back and scalp removed for cosmesis     4. Unknown duration asymptomatic lesion of forehead     5. Several year history of persistent increasing in number asymptomatic lesions on face and chest     Review of Systems:  Constitutional: Reports general sense of well-being. Skin: No interval severe sunburns. Allergies: Reviewed and updated. Past Medical History, Surgical History, Medications and Allergies reviewed.      Past Medical History:   Diagnosis Date    Abnormal Pap smear of cervix     Anxiety     Autoimmune disorder (HCC)     Crohn disease ([facility] [address]     Depression     Menopausal symptoms     Scoliosis      Past Surgical History:   Procedure Laterality Date    BACK SURGERY      jia placed for scoliosis age 14    COLONOSCOPY      Every 2-3 years    COLONOSCOPY N/A 7/23/2020    COLONOSCOPY WITH BIOPSY performed by Carmencita Mayes MD at [address] N/A 7/23/2020    [address] performed by Carmencita Mayes MD at [facility]      TONSILLECTOMY      UPPER GASTROINTESTINAL ENDOSCOPY N/A 7/23/2020    EGD BIOPSY performed by Carmencita Mayes MD at [address]   Allergen Reactions    Sulfa Antibiotics Hives and Rash    Sulfamethoxazole-Trimethoprim Hives and Rash Outpatient Medications Marked as Taking for the 3/30/21 encounter (Office Visit) with Pauline Mills MD   Medication Sig Dispense Refill    mesalamine (LIALDA) 1.2 g EC tablet Take 1 tablet by mouth daily  11    progesterone (PROMETRIUM) 200 MG capsule Take 200 mg by mouth daily      buPROPion (WELLBUTRIN XL) 150 MG extended release tablet Take 150 mg by mouth         Physical Examination     The following were examined and determined to be normal: Psych/Neuro, Conjunctivae/eyelids, Gums/teeth/lips, Neck, Nails/digits and Genitalia/groin/buttocks. The following were examined and determined to be abnormal: Scalp/hair, Head/face, Breast/axilla/chest, Abdomen, Back, RUE, LUE, RLE and LLE. -General: Well-appearing, NAD  1. Midline upper chest - scar clear   2. Scattered on the trunk and extremities are multiple well-defined round and oval symmetric smoothly-bordered uniformly brown macules and papules. 3. R upper back 1, vertex scalp 1 - well-defined \"stuck-on\" verrucous tan-brown papule(s)  4. R upper forehead 1 - ill-defined irregularly-shaped roughly-scaling thin pink macule(s)/papule(s)   5. Lateral forehead, upper chest - round tan macules     Assessment and Plan     1. History of NMSC - clear today  -Reviewed sun protective behavior -- sun avoidance during the peak hours of the day, sun-protective clothing (including hat and sunglasses), OTC sunscreen use (water resistant, broad spectrum, SPF at least 30, need for reapplication every 2 to 3 hours), avoidance of tanning beds  -Full skin exam in 6 months (sooner if indicated)       2. Benign acquired melanocytic nevi / hx dysplastic nevus   -Recommend monthly self skin exams   -Educated regarding the ABCDEs of melanoma detection   -Call for any new/changing moles or concerning lesions    3. Seborrheic keratosis(es)  -Reassurance re: benignity  -2 lesion(s) treated w/ liquid nitrogen x 2 cycles - R upper back 1, vertex scalp 1 .  Edu re: risk of blister formation, discomfort, scar, hypopigmentation. Discussed wound care.    -Pt agreed to $150 cosmetic fee     4. Actinic keratosis(es)  -Edu re: relationship with chronic cumulative sun exposure, low premalignant potential.   -1 lesion(s) treated w/ liquid nitrogen x 2 cycles - forehead. Edu re: risk of blister formation, discomfort, scar, hypopigmentation. Discussed wound care w/ vaseline or Aquaphor.        5. Solar lentigines  -Edu re: benignity, relationship w/ chronic cumulative sun exposure, darkening w/ unprotected sun exposure  -Pt to consider IPL of face and decolletage

## 2021-03-25 NOTE — PATIENT INSTRUCTIONS
Protecting Yourself From the Sun    · Apply an over-the-counter broad spectrum water resistant sunscreen with an SPF of at least 30 to exposed areas of the skin. Dont forget the ears and lips! Remember to reapply sunscreen about every 2 hours and after swimming or sweating. · Wear sun protective clothing. Swim shirts (aka. rash guards) are a great idea and negates the need to reapply sunscreen in those areas. · Seek the shade whenever possible especially between the hours of 10 am and 4 pm when the suns rays are the strongest.     · Avoid tanning beds     Cryosurgery (Freezing) Wound Care Instructions    AFTER THE PROCEDURE:    You will notice swelling and redness around the site. This is normal.    You may experience a sharp or sore feeling for the next several days. For this discomfort, you may take acetaminophen (Tylenol©).  A blister may develop at the treated area, sometimes as soon as by the end of the day. After several days, the blister will subside and a scab will form.  If the area is bumped or traumatized during the first few days following freezing, you may develop bleeding into the blister, forming a blood blister. This is nothing to be alarmed about.  If the blister is tense, uncomfortable, or much larger than the site that was frozen, you may pop the blister along its edge with a sterile needle (boiled, heated under a flame, or cleaned with alcohol) to allow the fluid to drain out. If the blister does not bother you, no treatment is needed.  Do NOT peel off the top of the blister roof. It will act as a dressing on top of your wound. WOUND CARE:    You may shower or bathe as usual, but avoid scrubbing the areas that have been frozen.  Cleanse the site twice a day with mild soapy water, and then apply a thin film of white petrolatum (Vaseline©).  You do not need to cover the area, but can if you prefer.     Do NOT allow the site to become dry or crusted, or attempt to dry it out with rubbing alcohol or hydrogen peroxide.  Continue this regimen until the area is pink and healed. Depending on the size and location of your cryosurgery site, healing may take 2 to 4 weeks.  The area may continue to be pink for several weeks, and over the next few months may become darker or lighter than the surrounding skin. This may be a permanent change.

## 2021-03-30 ENCOUNTER — OFFICE VISIT (OUTPATIENT)
Dept: DERMATOLOGY | Age: 51
End: 2021-03-30
Payer: COMMERCIAL

## 2021-03-30 VITALS — TEMPERATURE: 97.2 F

## 2021-03-30 DIAGNOSIS — Z85.828 HISTORY OF NONMELANOMA SKIN CANCER: ICD-10-CM

## 2021-03-30 DIAGNOSIS — Z12.83 SCREENING EXAM FOR SKIN CANCER: ICD-10-CM

## 2021-03-30 DIAGNOSIS — L82.1 SEBORRHEIC KERATOSES: ICD-10-CM

## 2021-03-30 DIAGNOSIS — L81.4 SOLAR LENTIGINOSIS: ICD-10-CM

## 2021-03-30 DIAGNOSIS — Z41.1 ELECTIVE PROCEDURE FOR UNACCEPTABLE COSMETIC APPEARANCE: ICD-10-CM

## 2021-03-30 DIAGNOSIS — Z86.018 HISTORY OF DYSPLASTIC NEVUS: ICD-10-CM

## 2021-03-30 DIAGNOSIS — L57.0 ACTINIC KERATOSES: ICD-10-CM

## 2021-03-30 DIAGNOSIS — D22.9 MULTIPLE BENIGN NEVI: Primary | ICD-10-CM

## 2021-03-30 PROCEDURE — 99213 OFFICE O/P EST LOW 20 MIN: CPT | Performed by: DERMATOLOGY

## 2021-03-30 PROCEDURE — 17000 DESTRUCT PREMALG LESION: CPT | Performed by: DERMATOLOGY

## 2021-03-30 PROCEDURE — 1711000 COSMETIC-DESTRUCT B9 LESION 1-14: Performed by: DERMATOLOGY

## 2021-04-11 ASSESSMENT — ENCOUNTER SYMPTOMS
COUGH: 0
ABDOMINAL PAIN: 0
CONSTIPATION: 0
CHEST TIGHTNESS: 0
DIARRHEA: 0
NAUSEA: 0
VOMITING: 0
SORE THROAT: 0
SHORTNESS OF BREATH: 0

## 2021-04-15 ENCOUNTER — OFFICE VISIT (OUTPATIENT)
Dept: DERMATOLOGY | Age: 51
End: 2021-04-15
Payer: COMMERCIAL

## 2021-04-15 VITALS — TEMPERATURE: 98.1 F

## 2021-04-15 DIAGNOSIS — R20.9 DISTURBANCE OF SKIN SENSATION: ICD-10-CM

## 2021-04-15 DIAGNOSIS — L91.0 HYPERTROPHIC SCAR: Primary | ICD-10-CM

## 2021-04-15 PROCEDURE — 11900 INJECT SKIN LESIONS </W 7: CPT | Performed by: DERMATOLOGY

## 2021-04-15 NOTE — PROGRESS NOTES
[facility]) Dermatology  Dalia Faye Virajadvidalban      [facility]  1970    46 y.o. female     Date of Visit: 4/15/2021    Last Visit: 2wks    Chief Complaint: Lesion    History of Present Illness:  1. Pt complains that the midline upper chest curettage scar has been causing severe pruritus     Derm History:   -Hx NMSC - sBCC midline upper chest s/p curettage 9/2020  -Hx moderately dysplastic nevus, L upper back - excised via biopsy 8/2020  -History of actinic keratoses s/p cryotherapy. Review of Systems:  Constitutional: Reports general sense of well-being. Allergies: Reviewed and updated. Past Medical History, Surgical History, Medications and Allergies reviewed.      Past Medical History:   Diagnosis Date    Abnormal Pap smear of cervix     Anxiety     Autoimmune disorder (HCC)     Crohn disease ([facility] [address]     Depression     Menopausal symptoms     Scoliosis      Past Surgical History:   Procedure Laterality Date    BACK SURGERY      jia placed for scoliosis age 14    COLONOSCOPY      Every 2-3 years    COLONOSCOPY N/A 7/23/2020    COLONOSCOPY WITH BIOPSY performed by Linnea Hernandez MD at [address] N/A 7/23/2020    [address] performed by Linnea Hernandez MD at [facility]      UPPER GASTROINTESTINAL ENDOSCOPY N/A 7/23/2020    EGD BIOPSY performed by Linnea Hernandez MD at [address]   Allergen Reactions    Sulfa Antibiotics Hives and Rash    Sulfamethoxazole-Trimethoprim Hives and Rash     Outpatient Medications Marked as Taking for the 4/15/21 encounter (Office Visit) with Dalia Faye MD   Medication Sig Dispense Refill    mesalamine (LIALDA) 1.2 g EC tablet Take 1 tablet by mouth daily  11    progesterone (PROMETRIUM) 200 MG capsule Take 200 mg by mouth daily      buPROPion (WELLBUTRIN XL) 150 MG extended release tablet Take 150 mg by mouth Physical Examination     The following were examined and determined to be normal: Psych/Neuro. The following were examined and determined to be abnormal: Breast/axilla/chest.     -General: Well-appearing, NAD  1. Midline upper chest - smooth thick pink healed scar     Assessment and Plan     1. Symptomatic hypertrophic scar, midline upper chest   -IL kenalog 10 mg/ml; total 0.1 ml to 1 lesion(s). Edu re: atrophy, dyspigmentation.

## 2021-07-22 ENCOUNTER — OFFICE VISIT (OUTPATIENT)
Dept: FAMILY MEDICINE CLINIC | Age: 51
End: 2021-07-22
Payer: COMMERCIAL

## 2021-07-22 VITALS
DIASTOLIC BLOOD PRESSURE: 62 MMHG | SYSTOLIC BLOOD PRESSURE: 108 MMHG | BODY MASS INDEX: 20.76 KG/M2 | OXYGEN SATURATION: 99 % | WEIGHT: 137 LBS | HEART RATE: 74 BPM | HEIGHT: 68 IN

## 2021-07-22 DIAGNOSIS — Z00.00 WELL ADULT EXAM: Primary | ICD-10-CM

## 2021-07-22 PROCEDURE — 99396 PREV VISIT EST AGE 40-64: CPT | Performed by: FAMILY MEDICINE

## 2021-07-22 NOTE — PROGRESS NOTES
Rell Barrera (:  1970) is a 46 y.o. female,Established patient, here for evaluation of the following chief complaint(s): Annual Exam (Patient is not fasting, here for be well physical.)         ASSESSMENT/PLAN:  Young Fritz was seen today for annual exam.    Diagnoses and all orders for this visit:    Well adult exam  -     Lipid Panel; Future  -     Comprehensive Metabolic Panel; Future  -     CBC; Future  -     Vitamin D 25 Hydroxy; Future  -     TSH with Reflex; Future    reviewed diet and exercise     No follow-ups on file. Subjective   SUBJECTIVE/OBJECTIVE:  HPI   Pt is a of 46 y.o. female comes in today with   Chief Complaint   Patient presents with    Annual Exam     Patient is not fasting, here for be well physical.     Here for physical.  Had covid in January. April had J+J. Was feeling sick for a week after that. Triggered a Crohn's flare as well. Review of Systems   Constitutional: Negative. Respiratory: Negative. Cardiovascular: Negative. Objective   Physical Exam  Constitutional:       General: She is not in acute distress. Appearance: She is well-developed. She is not diaphoretic. HENT:      Head: Normocephalic and atraumatic. Eyes:      General: No scleral icterus. Neck:      Thyroid: No thyroid mass or thyromegaly. Trachea: No tracheal deviation. Cardiovascular:      Rate and Rhythm: Normal rate and regular rhythm. Heart sounds: Normal heart sounds. No murmur heard. Pulmonary:      Effort: Pulmonary effort is normal.      Breath sounds: Normal breath sounds. Musculoskeletal:      Cervical back: Normal range of motion and neck supple. Lymphadenopathy:      Head:      Right side of head: No submandibular adenopathy. Left side of head: No submandibular adenopathy. Cervical: No cervical adenopathy. Skin:     General: Skin is warm and dry. Findings: No rash.    Neurological:      Mental Status: She is alert and oriented to person, place, and time. Cranial Nerves: No cranial nerve deficit. Psychiatric:         Behavior: Behavior normal.         Thought Content: Thought content normal.         Judgment: Judgment normal.              An electronic signature was used to authenticate this note.     --Chavo Castellon MD

## 2021-07-27 DIAGNOSIS — Z00.00 WELL ADULT EXAM: ICD-10-CM

## 2021-07-27 LAB
A/G RATIO: 1.7 (ref 1.1–2.2)
ALBUMIN SERPL-MCNC: 4.5 G/DL (ref 3.4–5)
ALP BLD-CCNC: 36 U/L (ref 40–129)
ALT SERPL-CCNC: 11 U/L (ref 10–40)
ANION GAP SERPL CALCULATED.3IONS-SCNC: 10 MMOL/L (ref 3–16)
AST SERPL-CCNC: 14 U/L (ref 15–37)
BILIRUB SERPL-MCNC: 0.5 MG/DL (ref 0–1)
BUN BLDV-MCNC: 12 MG/DL (ref 7–20)
CALCIUM SERPL-MCNC: 9.5 MG/DL (ref 8.3–10.6)
CHLORIDE BLD-SCNC: 104 MMOL/L (ref 99–110)
CHOLESTEROL, TOTAL: 219 MG/DL (ref 0–199)
CO2: 24 MMOL/L (ref 21–32)
CREAT SERPL-MCNC: 0.7 MG/DL (ref 0.6–1.1)
GFR AFRICAN AMERICAN: >60
GFR NON-AFRICAN AMERICAN: >60
GLOBULIN: 2.6 G/DL
GLUCOSE BLD-MCNC: 90 MG/DL (ref 70–99)
HCT VFR BLD CALC: 38.6 % (ref 36–48)
HDLC SERPL-MCNC: 53 MG/DL (ref 40–60)
HEMOGLOBIN: 13 G/DL (ref 12–16)
LDL CHOLESTEROL CALCULATED: 145 MG/DL
MCH RBC QN AUTO: 32.7 PG (ref 26–34)
MCHC RBC AUTO-ENTMCNC: 33.8 G/DL (ref 31–36)
MCV RBC AUTO: 96.8 FL (ref 80–100)
PDW BLD-RTO: 14.1 % (ref 12.4–15.4)
PLATELET # BLD: 292 K/UL (ref 135–450)
PMV BLD AUTO: 7.4 FL (ref 5–10.5)
POTASSIUM SERPL-SCNC: 4.3 MMOL/L (ref 3.5–5.1)
RBC # BLD: 3.99 M/UL (ref 4–5.2)
SODIUM BLD-SCNC: 138 MMOL/L (ref 136–145)
TOTAL PROTEIN: 7.1 G/DL (ref 6.4–8.2)
TRIGL SERPL-MCNC: 103 MG/DL (ref 0–150)
TSH REFLEX: 1.9 UIU/ML (ref 0.27–4.2)
VLDLC SERPL CALC-MCNC: 21 MG/DL
WBC # BLD: 5 K/UL (ref 4–11)

## 2021-07-28 LAB — VITAMIN D 25-HYDROXY: 137 NG/ML

## 2021-07-28 ASSESSMENT — ENCOUNTER SYMPTOMS: RESPIRATORY NEGATIVE: 1

## 2021-07-29 ENCOUNTER — TELEPHONE (OUTPATIENT)
Dept: FAMILY MEDICINE CLINIC | Age: 51
End: 2021-07-29

## 2021-08-05 ENCOUNTER — OFFICE VISIT (OUTPATIENT)
Dept: ORTHOPEDIC SURGERY | Age: 51
End: 2021-08-05
Payer: COMMERCIAL

## 2021-08-05 VITALS — BODY MASS INDEX: 20.76 KG/M2 | HEIGHT: 68 IN | WEIGHT: 137 LBS

## 2021-08-05 DIAGNOSIS — M17.11 PRIMARY OSTEOARTHRITIS OF RIGHT KNEE: Primary | ICD-10-CM

## 2021-08-05 DIAGNOSIS — M25.561 RIGHT KNEE PAIN, UNSPECIFIED CHRONICITY: ICD-10-CM

## 2021-08-05 PROCEDURE — 20610 DRAIN/INJ JOINT/BURSA W/O US: CPT | Performed by: PHYSICIAN ASSISTANT

## 2021-08-09 ENCOUNTER — TELEPHONE (OUTPATIENT)
Dept: ORTHOPEDIC SURGERY | Age: 51
End: 2021-08-09

## 2021-08-09 ENCOUNTER — NURSE TRIAGE (OUTPATIENT)
Dept: OTHER | Facility: CLINIC | Age: 51
End: 2021-08-09

## 2021-08-09 ENCOUNTER — OFFICE VISIT (OUTPATIENT)
Dept: FAMILY MEDICINE CLINIC | Age: 51
End: 2021-08-09
Payer: COMMERCIAL

## 2021-08-09 ENCOUNTER — TELEPHONE (OUTPATIENT)
Dept: FAMILY MEDICINE CLINIC | Age: 51
End: 2021-08-09

## 2021-08-09 VITALS
HEIGHT: 68 IN | BODY MASS INDEX: 20.85 KG/M2 | OXYGEN SATURATION: 98 % | HEART RATE: 97 BPM | WEIGHT: 137.6 LBS | DIASTOLIC BLOOD PRESSURE: 72 MMHG | SYSTOLIC BLOOD PRESSURE: 122 MMHG

## 2021-08-09 DIAGNOSIS — R21 RASH: Primary | ICD-10-CM

## 2021-08-09 PROCEDURE — 99213 OFFICE O/P EST LOW 20 MIN: CPT | Performed by: NURSE PRACTITIONER

## 2021-08-09 RX ORDER — PREDNISONE 20 MG/1
40 TABLET ORAL DAILY
Qty: 10 TABLET | Refills: 0 | Status: SHIPPED | OUTPATIENT
Start: 2021-08-09 | End: 2021-08-14

## 2021-08-09 SDOH — ECONOMIC STABILITY: FOOD INSECURITY: WITHIN THE PAST 12 MONTHS, YOU WORRIED THAT YOUR FOOD WOULD RUN OUT BEFORE YOU GOT MONEY TO BUY MORE.: NEVER TRUE

## 2021-08-09 SDOH — ECONOMIC STABILITY: FOOD INSECURITY: WITHIN THE PAST 12 MONTHS, THE FOOD YOU BOUGHT JUST DIDN'T LAST AND YOU DIDN'T HAVE MONEY TO GET MORE.: NEVER TRUE

## 2021-08-09 ASSESSMENT — PATIENT HEALTH QUESTIONNAIRE - PHQ9
2. FEELING DOWN, DEPRESSED OR HOPELESS: 0
SUM OF ALL RESPONSES TO PHQ9 QUESTIONS 1 & 2: 0
1. LITTLE INTEREST OR PLEASURE IN DOING THINGS: 0
SUM OF ALL RESPONSES TO PHQ QUESTIONS 1-9: 0

## 2021-08-09 ASSESSMENT — SOCIAL DETERMINANTS OF HEALTH (SDOH): HOW HARD IS IT FOR YOU TO PAY FOR THE VERY BASICS LIKE FOOD, HOUSING, MEDICAL CARE, AND HEATING?: NOT HARD AT ALL

## 2021-08-09 NOTE — PROGRESS NOTES
David Green (:  1970) is a 46 y.o. female,Established patient, here for evaluation of the following chief complaint(s):  Rash (Patient had a cortisol shot in her knee on Thursday. Noticed a swollen lymph node on neck on Friday. Saturday hands starts getting bumps and itchy. Now the right side of her face is swollen. Patient has tried benadryl and prednisone 10mg with some relief. Discussed with Ortho today (see telephone call). )         ASSESSMENT/PLAN:  1. Rash  Stable;  Begin pepcid and prednisone. Continue benadryl PRN. Call if worsening symptoms. Return if symptoms worsen or fail to improve. Subjective   SUBJECTIVE/OBJECTIVE:  HPI  Cortisone injection to right knee-  lymph nodes enlarged- left  Saturday- spraying benadryl and taking benadryl- rash  Yesterday- eye swelling  Today swelling on face has worsened  Is very itchy  No fevers  Took 2-5 mg prednisone  50 mg benadryl      Review of Systems       Objective   Physical Exam  Vitals reviewed. Constitutional:       Appearance: Normal appearance. HENT:      Head: Normocephalic. Right Ear: External ear normal.      Left Ear: External ear normal.   Cardiovascular:      Rate and Rhythm: Normal rate and regular rhythm. Pulses: Normal pulses. Heart sounds: Normal heart sounds, S1 normal and S2 normal.   Pulmonary:      Effort: Pulmonary effort is normal.      Breath sounds: Normal breath sounds and air entry. Lymphadenopathy:      Cervical: Cervical adenopathy present. Skin:     Findings: Rash present. Rash is macular. Comments: Multiple sites   Neurological:      Mental Status: She is alert. Psychiatric:         Mood and Affect: Mood normal.         An electronic signature was used to authenticate this note.     --Tung Puentes, MATEO - CNP

## 2021-08-09 NOTE — TELEPHONE ENCOUNTER
Received call from  [address] at [facility] with Red Flag Complaint. Brief description of triage: swollen lymph node left side of neck and area on cheek below left eye. Suspects insect bite    Triage indicates for patient to go to office now    Care advice provided, patient verbalizes understanding; denies any other questions or concerns; instructed to call back for any new or worsening symptoms. Writer provided warm transfer to Cecily Banerjee at [facility] for appointment scheduling. Attention Provider: Thank you for allowing me to participate in the care of your patient. The patient was connected to triage in response to information provided to the [facility]. Please do not respond through this encounter as the response is not directed to a shared pool. Reason for Disposition   Overlying skin is red    Answer Assessment - Initial Assessment Questions  1. LOCATION: \"Where is the swollen node located? \" \"Is the matching node on the other side of the body also swollen? \"       Left side of neck  2. SIZE: \"How big is the node? \" (Inches or centimeters) (or compare to common objects such as pea, bean, marble, golf ball)       Can just feel it, cheek is swollen underneath eye.   3. ONSET: \"When did the swelling start? \"       2 days ago  4. NECK NODES: \"Is there a sore throat, runny nose or other symptoms of a cold? \"       nothing  5. GROIN OR ARMPIT NODES: \"Is there a sore, scratch, cut or painful red area on that arm or leg? \"       no  6. FEVER: \"Do you have a fever? \" If so, ask: \"What is it, how was it measured, and when did it start? \"       no  7. CAUSE: \"What do you think is causing the swollen lymph nodes? \"      Unknown, maybe a bite  8. OTHER SYMPTOMS: \"Do you have any other symptoms? \"      No other symptoms  9. PREGNANCY: \"Is there any chance you are pregnant? \" \"When was your last menstrual period? \"      No, no periods    Protocols used: LYMPH NODES - SWOLLEN-ADULT-OH

## 2021-08-09 NOTE — TELEPHONE ENCOUNTER
Patient left a message and I called the patient back today. Patient feels that she has had a reaction to the corticosteroid injection. She notes that her knee is mildly swollen and slightly erythematous though she denies any discharge from the injection site fevers or chills. She also notes a pruritic rash that has developed on both upper extremities from the mid forearm to the hand as well as on the right side of her face. She has been taking Benadryl with only mild relief. She denies any other changes including utilization of any new medications, foods, recent travel, or exposure to anyone with similar condition. Patient does have an appointment with the nurse practitioner today. Patient was educated on the utilization of Benadryl and omeprazole for an H1/H2 antihistamine combination. She was educated on signs or symptoms for which she should go to the emergency department. Patient also notes continued knee pain once again denies any signs or symptoms of infection. Patient retrieved a great deal of benefit from viscosupplementation injections and we will get her into the office for administration of this medication. All the patient's questions were answered and patient agrees with the plan. She has a contact number to call if she has any further questions.         Fawn Palmer PA-C    Physician Assistant - Certified  [employer] and [address] [place] [address]    08/09/21 1:19 PM

## 2021-08-09 NOTE — PROGRESS NOTES
Chief Complaint    Follow-up (Right knee, requesting injection)      History of Present Illness:  Leonarda Bear is a 46 y.o. female who presents for follow-up evaluation of her right knee and further coordination of care. This patient is being treated with conservative therapy for the patellofemoral arthrosis in her right knee. Patient was last seen on 09/15/2020 at which time she had a viscosupplementation injection in the right knee. Patient notes over a year of relief with this injection. She has felt insidious return of her baseline osteoarthritic symptoms over the past month. The patient denies any new injury. The patient denies any clicking, popping, or locking of the joint. The patient denies any numbness, paresthesias, or weakness. Physical exam:  Inspection: Both knees without erythema, ecchymosis, discoloration, abrasion, contusions, deformity or signs of infection. Palpation: There is no tenderness to palpation to the joint line of both knees. There is patellofemoral crepitus palpable in both knees. No tenderness to palpation to any other osseous or soft tissue structures of the knee. Range of motion: Grossly intact  Neurovascular: Patient is neurovascularly intact, equally bilaterally. 2+ dorsal pedal pulses. Procedures:  After informed consent was provided, the patient was seated on the exam table with the right knee flexed to 90 degrees. The anterolateral aspect of the right knee adjacent to the joint line was prepped with Chlora-prep. The skin and subcutaneous tissues were anesthetized with ethyl chloride spray. A 22-gauge needle was inserted into the right knee and 2 ml of 40 mg/ml DepoMedrol was injected. The needle was withdrawn and the puncture site sealed with a Band-Aid. The patient tolerated the procedure well. Post injection instructions and precautions given and any problems to notify us.   (Conducted by Abhijit DAIGLE)          Assessment:  Encounter Diagnoses Name Primary?  Primary osteoarthritis of right knee Yes    Right knee pain, unspecified chronicity          Treatment plan:  1. Observe postinjection precautions  2. Rest   3. Ice 20 minutes ever 1-2 hours PRN  4. Utilize medications as prescribed  5. Activity modification  6. Lightweight exercise/low impact exercise  7. Appropriate diet/weight loss                  Rodrickjohn Cheung, [place]  08/09/21 1:16 PM  Physician Assistant - Certified      This dictation was performed with a verbal recognition program (DRAGON) and it was checked for errors. It is possible that there are still dictated errors within this office note. If so, please bring any errors to my attention for an addendum. All efforts were made to ensure that this office note is accurate.

## 2021-08-09 NOTE — TELEPHONE ENCOUNTER
Left lymph node swollen 2 days ago, yesterday  Left eye swollen today left side of face swollen, pt has been on benadryl for 2 days with no help  Pt asking to be seen today by Dr. Debbie Galvan  Physical 7/22

## 2021-08-16 DIAGNOSIS — M17.11 PRIMARY OSTEOARTHRITIS OF RIGHT KNEE: Primary | ICD-10-CM

## 2021-08-17 NOTE — PROGRESS NOTES
2cc Depo  Lot#: [id number]  Exp: 6/2022  NDC: 1123-3923-26      Injection site: RIGHT KNEE    Office Supplied

## 2021-08-26 ENCOUNTER — OFFICE VISIT (OUTPATIENT)
Dept: ORTHOPEDIC SURGERY | Age: 51
End: 2021-08-26
Payer: COMMERCIAL

## 2021-08-26 VITALS — WEIGHT: 137.57 LBS | BODY MASS INDEX: 20.85 KG/M2 | HEIGHT: 68 IN

## 2021-08-26 DIAGNOSIS — M25.561 CHRONIC PAIN OF RIGHT KNEE: ICD-10-CM

## 2021-08-26 DIAGNOSIS — G89.29 CHRONIC PAIN OF RIGHT KNEE: ICD-10-CM

## 2021-08-26 DIAGNOSIS — M17.11 PRIMARY OSTEOARTHRITIS OF RIGHT KNEE: Primary | ICD-10-CM

## 2021-08-26 PROCEDURE — 20610 DRAIN/INJ JOINT/BURSA W/O US: CPT | Performed by: PHYSICIAN ASSISTANT

## 2021-08-26 RX ORDER — LIDOCAINE HYDROCHLORIDE 10 MG/ML
20 INJECTION, SOLUTION INFILTRATION; PERINEURAL ONCE
Status: COMPLETED | OUTPATIENT
Start: 2021-08-26 | End: 2021-08-26

## 2021-08-26 RX ADMIN — LIDOCAINE HYDROCHLORIDE 20 ML: 10 INJECTION, SOLUTION INFILTRATION; PERINEURAL at 10:55

## 2021-08-27 NOTE — TELEPHONE ENCOUNTER
[address] COMPLAINT   Chief Complaint   Patient presents with    Emesis     RSV positive        HPI   Beena Suero is a 1 y.o. female fully immunized and well in general who presents with cough, difficulty breathing with vomiting and fever, onset was earlier in the week, worse today. The duration has been constant since the onset. The patient has associated fever. There are no alleviating factors. The context is that the symptoms started spontaneously, without any known precipitants. REVIEW OF SYSTEMS   Pulmonary: +cough and rhinorrrhea  GI: +vomiting; denies diarrhea  General: +fevers (101.9 F)  : No hematuria or dysuria   MSK: no myalgia or deformity  See HPI for further details. All other review of systems are reviewed and are otherwise negative. PAST MEDICAL & SURGICAL HISTORY   Past Medical History:   Diagnosis Date    Single live birth 2019    sister with congenital hip dysplasia 2019      History reviewed. No pertinent surgical history.      CURRENT MEDICATIONS   Current Outpatient Rx   Medication Sig Dispense Refill    ondansetron (ZOFRAN ODT) 4 MG disintegrating tablet Take 0.5 tablets by mouth every 8 hours as needed for Nausea or Vomiting 5 tablet 0    acetaminophen (ACEPHEN) 120 MG suppository Place 1 suppository rectally every 4 hours as needed for Fever 30 suppository 3    prednisoLONE 15 MG/5ML solution Take 4.2 mLs by mouth daily for 5 days 21 mL 0    Pediatric Multivit-Minerals-C ([employer] GUMMY MULTIVITAMIN KIDS PO) Take 1 tablet by mouth daily          ALLERGIES   No Known Allergies     SOCIAL AND FAMILY HISTORY   Social History     Socioeconomic History    Marital status: Single     Spouse name: None    Number of children: None    Years of education: None    Highest education level: None   Occupational History    None   Tobacco Use    Smoking status: Never Smoker    Smokeless tobacco: Never Used   Vaping Use    Vaping Use: MRI precert # [id number] expires 2/15/2021 Never used   Substance and Sexual Activity    Alcohol use: None    Drug use: None    Sexual activity: None   Other Topics Concern    None   Social History Narrative    ** Merged History Encounter **          Social Determinants of Health     Financial Resource Strain: Low Risk     Difficulty of Paying Living Expenses: Not hard at all   Food Insecurity: No Food Insecurity    Worried About Running Out of Food in the Last Year: Never true    Raquel of Food in the Last Year: Never true   Transportation Needs: No Transportation Needs    Lack of Transportation (Medical): No    Lack of Transportation (Non-Medical): No   Physical Activity:     Days of Exercise per Week:     Minutes of Exercise per Session:    Stress:     Feeling of Stress :    Social Connections:     Frequency of Communication with Friends and Family:     Frequency of Social Gatherings with Friends and Family:     Attends Scientologist Services:     Active Member of Clubs or Organizations:     Attends Club or Organization Meetings:     Marital Status:    Intimate Partner Violence:     Fear of Current or Ex-Partner:     Emotionally Abused:     Physically Abused:     Sexually Abused:       Family History   Problem Relation Age of Onset    High Cholesterol Mother     No Known Problems Father         PHYSICAL EXAM   VITAL SIGNS: Pulse 143   Temp 100.9 °F (38.3 °C)   Resp 30   Wt 26 lb (11.8 kg)   SpO2 97%    Constitutional: Well developed, well nourished   Eyes: Sclera nonicteric, conjunctiva moist   HENT: Atraumatic, nose normal, no stridor  Neck: Supple, no JVD   Respiratory: No retractions, no accessory muscle use, normal bilateral breath sounds   Cardiovascular: tachycardic rate, normal rhythm, no murmurs   GI: Soft, no abdominal tenderness, no guarding, bowel sounds present  Integument: No rash, dry skin.   Neurologic: Alert & oriented, with normal interaction with family    RADIOLOGY/PROCEDURES   No orders to display        LABS Labs Reviewed   CBC WITH AUTO DIFFERENTIAL - Abnormal; Notable for the following components:       Result Value    WBC 6.1 (*)     MCV 75.8 (*)     MPV 8.9 (*)     Lymphocytes Absolute 1.0 (*)     All other components within normal limits   BASIC METABOLIC PANEL W/ REFLEX TO MG FOR LOW K - Abnormal; Notable for the following components:    CO2 18 (*)     CREATININE 0.3 (*)     All other components within normal limits   ANION GAP - Abnormal; Notable for the following components:    Anion Gap 20.0 (*)     All other components within normal limits   OSMOLALITY - Abnormal; Notable for the following components:    Osmolality Calc 270.9 (*)     All other components within normal limits        ED COURSE & MEDICAL DECISION MAKING   Pertinent Labs & Imaging studies reviewed and interpreted. (See chart for details)   See EMR for medications prescribed   Vitals:    08/27/21 0050   Pulse:    Resp:    Temp: 100.9 °F (38.3 °C)   SpO2:         Differential diagnosis: URI, croup, bronchitis, bronchiolitis    FINAL IMPRESSION   1. Non-intractable vomiting with nausea, unspecified vomiting type    2. Dehydration    3. Respiratory syncytial virus (RSV)         PLAN   Possible viral illness, found to have RSV today, with increased work of breathing. Re-assessment at 0140 - pt improved with fluids and after extended observation, was able to tolerate PO, stable for dc at this time.     Electronically signed by: Beth Ac MD, 8/27/2021 1:42 AM   (This note was completed with a voice recognition program)        Deirdre Hartman MD  08/27/21 7510

## 2021-10-25 ENCOUNTER — HOSPITAL ENCOUNTER (OUTPATIENT)
Dept: WOMENS IMAGING | Age: 51
Discharge: HOME | End: 2021-10-25
Payer: COMMERCIAL

## 2021-10-25 DIAGNOSIS — Z12.31 VISIT FOR SCREENING MAMMOGRAM: ICD-10-CM

## 2021-10-25 PROCEDURE — 77063 BREAST TOMOSYNTHESIS BI: CPT

## 2021-10-26 ENCOUNTER — OFFICE VISIT (OUTPATIENT)
Dept: ENT CLINIC | Age: 51
End: 2021-10-26
Payer: COMMERCIAL

## 2021-10-26 VITALS
HEART RATE: 86 BPM | HEIGHT: 68 IN | WEIGHT: 139 LBS | BODY MASS INDEX: 21.07 KG/M2 | TEMPERATURE: 98.6 F | SYSTOLIC BLOOD PRESSURE: 122 MMHG | DIASTOLIC BLOOD PRESSURE: 74 MMHG

## 2021-10-26 DIAGNOSIS — H69.81 DYSFUNCTION OF RIGHT EUSTACHIAN TUBE: ICD-10-CM

## 2021-10-26 DIAGNOSIS — K11.23 CHRONIC SCLEROSING SIALADENITIS: Primary | ICD-10-CM

## 2021-10-26 PROCEDURE — 99213 OFFICE O/P EST LOW 20 MIN: CPT | Performed by: OTOLARYNGOLOGY

## 2021-10-26 PROCEDURE — 76536 US EXAM OF HEAD AND NECK: CPT | Performed by: OTOLARYNGOLOGY

## 2021-10-26 ASSESSMENT — ENCOUNTER SYMPTOMS
EYE ITCHING: 0
EYE REDNESS: 0
SORE THROAT: 0
SINUS PAIN: 0
NAUSEA: 0
RHINORRHEA: 0
CHOKING: 0
COUGH: 0
EYE PAIN: 0
DIARRHEA: 0
FACIAL SWELLING: 0
SINUS PRESSURE: 0
SHORTNESS OF BREATH: 0
TROUBLE SWALLOWING: 0
VOICE CHANGE: 0

## 2021-10-26 NOTE — PROGRESS NOTES
Subjective:      Patient ID: Hari Alberts is a 46 y.o. female. HPI  Chief Complaint   Patient presents with    lymph node       History of Present Illness  Head/Neck    Patricio Paula is a(n) 46 y.o. female who presents with a 6 month history of a left neck lump. There since Covid. Non tender. Has not changed in size. Pain: No  Location: neck  Onset: As above  Timing: no change  Otalgia: Yes, right. Since hyperbaric. History of sinus problems. NO hearing changes. Responded to steroids.    Odynophagia: No  Dysphagia: No  Voice Changes: No  Lumps in neck: Yes  Modifying Factors: Non smoker  Associates Signs and Symptoms: None    Patient Active Problem List   Diagnosis    On hormone replacement therapy     Past Surgical History:   Procedure Laterality Date    BACK SURGERY      jia placed for scoliosis age 14    COLONOSCOPY      Every 2-3 years    COLONOSCOPY N/A 7/23/2020    COLONOSCOPY WITH BIOPSY performed by Sharad Rivas MD at [address] [facility] N/A 7/23/2020    [address] performed by Sharad [facility]ivas MD at [facility]      TONSILLECTOMY      UPPER GASTROINTESTINAL ENDOSCOPY N/A 7/23/2020    EGD BIOPSY performed by Sharad Rivas MD at [address] ENDOSCOPY     Family History   Problem Relation Age of Onset    Heart Disease Paternal Grandfather     Heart Attack Paternal Grandfather     Thyroid Disease Paternal Grandmother     No Known Problems Maternal Grandfather     Heart Disease Father     Cancer Father         skin    Rheum Arthritis Mother     No Known Problems Brother     Other Sister         chrones    No Known Problems Brother     No Known Problems Brother     Other Sister         chrones    Liver Disease Sister         hep c    No Known Problems Brother     No Known Problems Brother     No Known Problems Daughter     No Known Problems Son      Social History     Socioeconomic History    Marital status:      Spouse name: Not on file    Number of children: Not on file    Years of education: Not on file    Highest education level: Not on file   Occupational History    Not on file   Tobacco Use    Smoking status: Never Smoker    Smokeless tobacco: Never Used   Vaping Use    Vaping Use: Never used   Substance and Sexual Activity    Alcohol use: Yes     Comment: 2-3 times per week    Drug use: Never    Sexual activity: Yes     Partners: Male   Other Topics Concern    Not on file   Social History Narrative    Not on file     Social Determinants of Health     Financial Resource Strain: Low Risk     Difficulty of Paying Living Expenses: Not hard at all   Food Insecurity: No Food Insecurity    Worried About [employer] in the Last Year: Never true    [address] in the Last Year: Never true   Transportation Needs:     Lack of Transportation (Medical):  Lack of Transportation (Non-Medical):    Physical Activity:     Days of Exercise per Week:     Minutes of Exercise per Session:    Stress:     Feeling of Stress :    Social Connections:     Frequency of Communication with Friends and Family:     Frequency of Social Gatherings with Friends and Family:     Attends Yarsani Services:     Active Member of Clubs or Organizations:     Attends Club or Organization Meetings:     Marital Status:    Intimate Partner Violence:     Fear of Current or Ex-Partner:     Emotionally Abused:     Physically Abused:     Sexually Abused:        DRUG/FOOD ALLERGIES: Sulfa antibiotics, Tetanus-diphtheria toxoids td, and Sulfamethoxazole-trimethoprim    CURRENT MEDICATIONS  Prior to Admission medications    Medication Sig Start Date End Date Taking?  Authorizing Provider   mesalamine (LIALDA) 1.2 g EC tablet Take 1 tablet by mouth daily 10/5/19  Yes Historical Provider, MD   progesterone (PROMETRIUM) 200 MG capsule Take 200 mg by mouth daily   Yes Historical Provider, MD   buPROPion Rhythm: Normal rate and regular rhythm. Pulmonary:      Breath sounds: No stridor. Musculoskeletal:      Cervical back: Normal range of motion and neck supple. No edema or erythema. No muscular tenderness. Lymphadenopathy:      Head:      Right side of head: No submental, submandibular, tonsillar, preauricular, posterior auricular or occipital adenopathy. Left side of head: No submental, submandibular, tonsillar, preauricular or occipital adenopathy. Cervical: No cervical adenopathy. Right cervical: No superficial, deep or posterior cervical adenopathy. Left cervical: No superficial, deep or posterior cervical adenopathy. Skin:     General: Skin is warm and dry. Findings: No lesion. Neurological:      Mental Status: She is alert and oriented to person, place, and time. Psychiatric:         Mood and Affect: Mood is not anxious or depressed. NECK ULTRASOUND    Pre-op Diagnosis: left submandibular swelling. Anesthesia: None  Complications: none  Estimated Blood Loss: none  Indications: Neck swelling  Procedure: neck US    The patient was placed in a semi-recumbent position with mild neck extension. Real time B-mode ultrasound on the neck was performed in transverse/ axial and longitudinal/ sagittal planes. Findings:   No lesions or masses in left submandibular gland. Small 3mm stone. Ultrasound guided fine needle aspiration was not performed. .     The patient tolerated the procedure well and without side effects. I attest that I was present for and did the entire procedure myself. Assessment:       Diagnosis Orders   1. Chronic sclerosing sialadenitis     2. Dysfunction of right eustachian tube             Plan:      Sialogogues. Call if swells and painful. Decongestants prior to hyperbaric. Consider tube if benefits of hyperbaric outweigh risks.          Elinor Casey MD

## 2022-04-28 ENCOUNTER — PROCEDURE VISIT (OUTPATIENT)
Dept: ENT CLINIC | Age: 52
End: 2022-04-28
Payer: COMMERCIAL

## 2022-04-28 VITALS
HEIGHT: 68 IN | SYSTOLIC BLOOD PRESSURE: 122 MMHG | BODY MASS INDEX: 21.13 KG/M2 | DIASTOLIC BLOOD PRESSURE: 77 MMHG | HEART RATE: 94 BPM

## 2022-04-28 DIAGNOSIS — H69.81 DYSFUNCTION OF RIGHT EUSTACHIAN TUBE: Primary | ICD-10-CM

## 2022-04-28 PROCEDURE — 69433 CREATE EARDRUM OPENING: CPT | Performed by: OTOLARYNGOLOGY

## 2022-04-28 NOTE — PROGRESS NOTES
Patient here for right PE tube for pain with hyperbaric oxygen. PE Tube  Pre op Dx: ETD right,   Post Op: Same  Procedure: PE Tube placement    An operating microscope was utilized to visualize the external auditory canals bilaterally. Cerumen was removed with curettes and vann suctions on the the right side. The tympanic membrane was  intact. Phenol was applied on the inferior anterior right tympanic membrane. A myringotomy was performed and a PE-tube was placed without difficulty. Middle era fluid was seen and suctioned. The patient tolerated well and there were no complications. I attest that I was present for and did the entire procedure myself. Follow up as needed.

## 2022-05-02 ENCOUNTER — TELEPHONE (OUTPATIENT)
Dept: ENT CLINIC | Age: 52
End: 2022-05-02

## 2022-05-02 NOTE — TELEPHONE ENCOUNTER
Pt called with question on the tube placement  Last week she feels fluid in the ear and eco ho is this normal please advise

## 2022-05-03 NOTE — TELEPHONE ENCOUNTER
Patient called in and relayed Dr. Shanda Morfin message to her. Attempted to get the patient scheduled however the next available with a hearing test would be Tuesday 05/17/2022. Patient does not wish to wait this long and would like to be seen sooner. Spoke with Nina Moe who said we can try to get the patient scheduled Dr. Zeynep Forde. Attila spoke with the patient about this as Dr. Chrystal Suresh office is not opened yet.

## 2022-05-03 NOTE — TELEPHONE ENCOUNTER
Patient called again asking for an update. Informed patient the office has attempted to call Dr. Cristel Ybarra office twice with out an answer. Provided the patient with the number to call ([phone]). Patient stated she would call the office if she gets an appointment scheduled.

## 2022-05-03 NOTE — TELEPHONE ENCOUNTER
Patient scheduled with [facility] audiology will follow up with Dr. Tia Sanderson on [place]

## 2022-05-05 ENCOUNTER — TELEPHONE (OUTPATIENT)
Dept: ENT CLINIC | Age: 52
End: 2022-05-05

## 2022-05-05 NOTE — TELEPHONE ENCOUNTER
Pt says they can no longer hear anything after having the tube placed in their ear, pt states there is no pain but there is sometimes an echo in the ear. Pt would like to let Conrad Walters know they have an audio appt w/ [facility] at 2pm today that they will still go to. Please contact pt with suggestions if possible, Thank you!

## 2022-05-09 ENCOUNTER — OFFICE VISIT (OUTPATIENT)
Dept: ENT CLINIC | Age: 52
End: 2022-05-09
Payer: COMMERCIAL

## 2022-05-09 VITALS
WEIGHT: 139 LBS | HEART RATE: 73 BPM | SYSTOLIC BLOOD PRESSURE: 124 MMHG | HEIGHT: 68 IN | DIASTOLIC BLOOD PRESSURE: 71 MMHG | BODY MASS INDEX: 21.07 KG/M2

## 2022-05-09 DIAGNOSIS — J01.20 ACUTE NON-RECURRENT ETHMOIDAL SINUSITIS: ICD-10-CM

## 2022-05-09 DIAGNOSIS — H69.81 DYSFUNCTION OF RIGHT EUSTACHIAN TUBE: Primary | ICD-10-CM

## 2022-05-09 DIAGNOSIS — H92.01 OTALGIA, RIGHT: ICD-10-CM

## 2022-05-09 PROCEDURE — 99214 OFFICE O/P EST MOD 30 MIN: CPT | Performed by: OTOLARYNGOLOGY

## 2022-05-09 RX ORDER — AMOXICILLIN 500 MG/1
500 CAPSULE ORAL 3 TIMES DAILY
Qty: 30 CAPSULE | Refills: 0 | Status: SHIPPED | OUTPATIENT
Start: 2022-05-09 | End: 2022-05-19

## 2022-05-09 ASSESSMENT — ENCOUNTER SYMPTOMS
EYE REDNESS: 0
VOICE CHANGE: 0
DIARRHEA: 0
SINUS PRESSURE: 0
CHOKING: 0
SHORTNESS OF BREATH: 0
EYE ITCHING: 0
FACIAL SWELLING: 0
SINUS PAIN: 0
RHINORRHEA: 0
EYE PAIN: 0
NAUSEA: 0
COUGH: 0
TROUBLE SWALLOWING: 0
SORE THROAT: 0

## 2022-05-09 NOTE — PROGRESS NOTES
Pt presents to ED with c/o SOB and cough.    Subjective:      Patient ID: Faith Prasad is a 45 y.o. female. HPI  Hearing Loss HPI  CC: hearing loss    General: Yaakov Ng is a(n) 45 y.o. female who presents with inability to hear right ear since tube. Had audiogram since which iis normal. Concern for blocked tube. Pain imrpoved but sound still off. Sinus congestion. Right.    How lon weeks  Side:right  Prior audiogram:Yes   ZREU:0[birth year]80   Where:[place]  Previous episodes: no  Tinnitus:No  Otorrhea:No  Vertigo:No  Prior ear surgery: No  Ear trauma: No  History of hearing loss: No      Patient Active Problem List   Diagnosis    On hormone replacement therapy     Past Surgical History:   Procedure Laterality Date    BACK SURGERY      jia placed for scoliosis age 14    COLONOSCOPY      Every 2-3 years    COLONOSCOPY N/A 2020    COLONOSCOPY WITH BIOPSY performed by Ursula Parada MD at [address] [facility] N/A 2020    ESOPHAGEAL DILATION [place] performed by Ursula Parada MD at [facility]      TONSILLECTOMY      UPPER GASTROINTESTINAL ENDOSCOPY N/A 2020    EGD BIOPSY performed by Ursula Parada MD at [address] ENDOSCOPY     Family History   Problem Relation Age of Onset    Heart Disease Paternal Grandfather     Heart Attack Paternal Grandfather     Thyroid Disease Paternal Grandmother     No Known Problems Maternal Grandfather     Heart Disease Father     Cancer Father         skin    Rheum Arthritis Mother     No Known Problems Brother     Other Sister         chrones    No Known Problems Brother     No Known Problems Brother     Other Sister         chrones    Liver Disease Sister         hep c    No Known Problems Brother     No Known Problems Brother     No Known Problems Daughter     No Known Problems Son      Social History     Socioeconomic History    Marital status:      Spouse name: Not on file    Number of children: Not on file    Years of education: Not on file    Highest education level: Not on file   Occupational History    Not on file   Tobacco Use    Smoking status: Never Smoker    Smokeless tobacco: Never Used   Vaping Use    Vaping Use: Never used   Substance and Sexual Activity    Alcohol use: Yes     Comment: 2-3 times per week    Drug use: Never    Sexual activity: Yes     Partners: Male   Other Topics Concern    Not on file   Social History Narrative    Not on file     Social Determinants of Health     Financial Resource Strain: Low Risk     Difficulty of Paying Living Expenses: Not hard at all   Food Insecurity: No Food Insecurity    Worried About [employer] [employer] in the Last Year: Never true    [facility] in the Last Year: Never true   Transportation Needs:     Lack of Transportation (Medical): Not on file    Lack of Transportation (Non-Medical):  Not on file   Physical Activity:     Days of Exercise per Week: Not on file    Minutes of Exercise per Session: Not on file   Stress:     Feeling of Stress : Not on file   Social Connections:     Frequency of Communication with Friends and Family: Not on file    Frequency of Social Gatherings with Friends and Family: Not on file    Attends Sikh Services: Not on file    Active Member of Clubs or Organizations: Not on file    Attends Club or Organization Meetings: Not on file    Marital Status: Not on file   Intimate Partner Violence:     Fear of Current or Ex-Partner: Not on file    Emotionally Abused: Not on file    Physically Abused: Not on file    Sexually Abused: Not on file   Housing Stability:     Unable to Pay for Housing in the Last Year: Not on file    Number of [place] in the Last Year: Not on file    Unstable Housing in the Last Year: Not on file       DRUG/FOOD ALLERGIES: Sulfa antibiotics, Tetanus-diphtheria toxoids td, and Sulfamethoxazole-trimethoprim    CURRENT MEDICATIONS  Prior to Admission medications    Medication Sig Start Date End Date Taking? Authorizing Provider   mesalamine (LIALDA) 1.2 g EC tablet Take 1 tablet by mouth daily 10/5/19  Yes Historical Provider, MD   progesterone (PROMETRIUM) 200 MG capsule Take 200 mg by mouth daily   Yes Historical Provider, MD   buPROPion (WELLBUTRIN XL) 150 MG extended release tablet Take 150 mg by mouth   Yes Historical Provider, MD       Lab Studies:  Lab Results   Component Value Date    WBC 5.0 07/27/2021    HGB 13.0 07/27/2021    HCT 38.6 07/27/2021    MCV 96.8 07/27/2021     07/27/2021     Lab Results   Component Value Date    GLUCOSE 90 07/27/2021    BUN 12 07/27/2021    CREATININE 0.7 07/27/2021    K 4.3 07/27/2021     07/27/2021     07/27/2021    CALCIUM 9.5 07/27/2021     No results found for: MG  No results found for: PHOS  Lab Results   Component Value Date    ALKPHOS 36 (L) 07/27/2021    ALT 11 07/27/2021    AST 14 (L) 07/27/2021    BILITOT 0.5 07/27/2021    PROT 7.1 07/27/2021     Review of Systems   Constitutional: Negative for activity change, appetite change, chills, fatigue and fever. HENT: Negative for congestion, ear discharge, ear pain, facial swelling, hearing loss, nosebleeds, postnasal drip, rhinorrhea, sinus pressure, sinus pain, sneezing, sore throat, tinnitus, trouble swallowing and voice change. Eyes: Negative for pain, redness, itching and visual disturbance. Respiratory: Negative for cough, choking and shortness of breath. Gastrointestinal: Negative for diarrhea and nausea. Endocrine: Negative for cold intolerance and heat intolerance. Objective:   Physical Exam  Constitutional:       General: She is not in acute distress. Appearance: She is well-developed. HENT:      Head: Not macrocephalic and not microcephalic. No Abel's sign, contusion, right periorbital erythema, left periorbital erythema or laceration. Right Ear: Hearing, tympanic membrane, ear canal and external ear normal. No decreased hearing noted.  No laceration, drainage, swelling or tenderness. No middle ear effusion. No foreign body. No mastoid tenderness. No hemotympanum. Tympanic membrane is not injected, perforated, retracted or bulging. Tympanic membrane has normal mobility. Left Ear: Hearing, tympanic membrane, ear canal and external ear normal. No decreased hearing noted. No laceration, drainage, swelling or tenderness. No middle ear effusion. No foreign body. No mastoid tenderness. No hemotympanum. Tympanic membrane is not injected, perforated, retracted or bulging. Tympanic membrane has normal mobility. Ears:      Rasmussen exam findings: does not lateralize. Right Rinne: AC > BC. Left Rinne: AC > BC. Nose: No mucosal edema or rhinorrhea. Mouth/Throat:      Pharynx: No oropharyngeal exudate or posterior oropharyngeal erythema. Tonsils: No tonsillar abscesses. Eyes:      Extraocular Movements:      Right eye: Normal extraocular motion and no nystagmus. Left eye: Normal extraocular motion and no nystagmus. Pupils:      Right eye: Pupil is reactive. Left eye: Pupil is reactive. Neck:      Thyroid: No thyroid mass or thyromegaly. Musculoskeletal:      Cervical back: Normal range of motion and neck supple. Lymphadenopathy:      Head:      Right side of head: No preauricular, posterior auricular or occipital adenopathy. Left side of head: No preauricular, posterior auricular or occipital adenopathy. Cervical:      Right cervical: No superficial, deep or posterior cervical adenopathy. Left cervical: No superficial, deep or posterior cervical adenopathy. Skin:     Findings: No bruising, erythema or lesion. Neurological:      Mental Status: She is alert and oriented to person, place, and time. Psychiatric:         Attention and Perception: Attention normal.         Mood and Affect: Mood normal.         Speech: Speech normal.         Assessment:       Diagnosis Orders   1.  Dysfunction of right eustachian tube     2. Otalgia, right     3. Acute non-recurrent ethmoidal sinusitis  amoxicillin (AMOXIL) 500 MG capsule           Plan:      Follow up in one week. ,The patient was counseled for sinusitis and received a amoxil prescription medication(s) for this diagnosis. The mechanism of action for the prescription(s) were explained/reviewed. The appropriate usage was described and technique for topical use explained if appropriate. Questions from the patient were answered and the prescription(s) were e-prescribed to the appropriate pharmacy.           Angelina Krishnan MD

## 2022-05-19 ENCOUNTER — OFFICE VISIT (OUTPATIENT)
Dept: ENT CLINIC | Age: 52
End: 2022-05-19
Payer: COMMERCIAL

## 2022-05-19 VITALS
HEIGHT: 68 IN | BODY MASS INDEX: 21.07 KG/M2 | WEIGHT: 139 LBS | SYSTOLIC BLOOD PRESSURE: 124 MMHG | DIASTOLIC BLOOD PRESSURE: 79 MMHG

## 2022-05-19 DIAGNOSIS — J01.21 RECURRENT ETHMOIDAL SINUSITIS: ICD-10-CM

## 2022-05-19 DIAGNOSIS — H69.81 DYSFUNCTION OF RIGHT EUSTACHIAN TUBE: Primary | ICD-10-CM

## 2022-05-19 PROCEDURE — 31231 NASAL ENDOSCOPY DX: CPT | Performed by: OTOLARYNGOLOGY

## 2022-05-19 PROCEDURE — 99214 OFFICE O/P EST MOD 30 MIN: CPT | Performed by: OTOLARYNGOLOGY

## 2022-05-19 RX ORDER — PREDNISONE 10 MG/1
TABLET ORAL
Qty: 38 TABLET | Refills: 0 | Status: SHIPPED | OUTPATIENT
Start: 2022-05-19 | End: 2022-07-21 | Stop reason: ALTCHOICE

## 2022-05-19 RX ORDER — CEFDINIR 300 MG/1
300 CAPSULE ORAL 2 TIMES DAILY
Qty: 42 CAPSULE | Refills: 0 | Status: SHIPPED | OUTPATIENT
Start: 2022-05-19 | End: 2022-06-09

## 2022-05-19 ASSESSMENT — ENCOUNTER SYMPTOMS
EYE ITCHING: 0
SINUS PAIN: 0
SHORTNESS OF BREATH: 0
DIARRHEA: 0
VOICE CHANGE: 0
NAUSEA: 0
EYE PAIN: 0
CHOKING: 0
FACIAL SWELLING: 0
COUGH: 0
TROUBLE SWALLOWING: 0
EYE REDNESS: 0
RHINORRHEA: 0
SINUS PRESSURE: 0
SORE THROAT: 0

## 2022-05-19 NOTE — PROGRESS NOTES
Subjective:      Patient ID: Shakira Hensley is a 45 y.o. female. HPI  Hearing Loss HPI  CC: hearing loss    General: Hernan Buck is a(n) 45 y.o. female who presents with inability to hear right ear since tube. Had audiogram since which iis normal. Concern for blocked tube. Pain imrpoved but sound still off. Sinus congestion. Right. How lon weeks  Side:right  Prior audiogram:Yes   DYOZ:224   Where:[place]  Previous episodes: no  Tinnitus:No  Otorrhea:No  Vertigo:No  Prior ear surgery: No  Ear trauma: No  History of hearing loss: No     Now with two week follow. Patient states continued right sided nasal congestion. Daily afrin. Ear with reverberation.        Patient Active Problem List   Diagnosis    On hormone replacement therapy     Past Surgical History:   Procedure Laterality Date    BACK SURGERY      jia placed for scoliosis age 14    COLONOSCOPY      Every 2-3 years   [facility] [place] COLONOSCOPY N/A 2020    COLONOSCOPY WITH BIOPSY performed by Ochoa De La Fuente MD at [address] [facility] N/A 2020    [address] performed by Ochoa De La Fuente MD at [facility]      TONSILLECTOMY      UPPER GASTROINTESTINAL ENDOSCOPY N/A 2020    EGD BIOPSY performed by Ochoa De La Fuente MD at [facility] ENDOSCOPY     Family History   Problem Relation Age of Onset    Heart Disease Paternal Grandfather     Heart Attack Paternal Grandfather     Thyroid Disease Paternal Grandmother     No Known Problems Maternal Grandfather     Heart Disease Father     Cancer Father         skin    Rheum Arthritis Mother     No Known Problems Brother     Other Sister         chrones    No Known Problems Brother     No Known Problems Brother     Other Sister         chrones    Liver Disease Sister         hep c    No Known Problems Brother     No Known Problems Brother     No Known Problems Daughter     No Known Problems Son      Social History Socioeconomic History    Marital status:      Spouse name: Not on file    Number of children: Not on file    Years of education: Not on file    Highest education level: Not on file   Occupational History    Not on file   Tobacco Use    Smoking status: Never Smoker    Smokeless tobacco: Never Used   Vaping Use    Vaping Use: Never used   Substance and Sexual Activity    Alcohol use: Yes     Comment: 2-3 times per week    Drug use: Never    Sexual activity: Yes     Partners: Male   Other Topics Concern    Not on file   Social History Narrative    Not on file     Social Determinants of Health     Financial Resource Strain: Low Risk     Difficulty of Paying Living Expenses: Not hard at all   Food Insecurity: No Food Insecurity    Worried About [address] [employer] in the Last Year: Never true    [address] [employer] in the Last Year: Never true   Transportation Needs:     Lack of Transportation (Medical): Not on file    Lack of Transportation (Non-Medical):  Not on file   Physical Activity:     Days of Exercise per Week: Not on file    Minutes of Exercise per Session: Not on file   Stress:     Feeling of Stress : Not on file   Social Connections:     Frequency of Communication with Friends and Family: Not on file    Frequency of Social Gatherings with Friends and Family: Not on file    Attends Anabaptism Services: Not on file    Active Member of [address] [place] [address] or Organizations: Not on file    Attends Club or Organization Meetings: Not on file    Marital Status: Not on file   Intimate Partner Violence:     Fear of Current or Ex-Partner: Not on file    Emotionally Abused: Not on file    Physically Abused: Not on file    Sexually Abused: Not on file   Housing Stability:     Unable to Pay for Housing in the Last Year: Not on file    Number of [place] in the Last Year: Not on file    Unstable Housing in the Last Year: Not on file       DRUG/FOOD ALLERGIES: Sulfa antibiotics, Tetanus-diphtheria toxoids td, and Sulfamethoxazole-trimethoprim    CURRENT MEDICATIONS  Prior to Admission medications    Medication Sig Start Date End Date Taking? Authorizing Provider   mesalamine (LIALDA) 1.2 g EC tablet Take 1 tablet by mouth daily 10/5/19  Yes Historical Provider, MD   progesterone (PROMETRIUM) 200 MG capsule Take 200 mg by mouth daily   Yes Historical Provider, MD   buPROPion (WELLBUTRIN XL) 150 MG extended release tablet Take 150 mg by mouth   Yes Historical Provider, MD   amoxicillin (AMOXIL) 500 MG capsule Take 1 capsule by mouth 3 times daily for 30 doses  Patient not taking: Reported on 5/19/2022 5/9/22 5/19/22  Vinita Barbosa MD       Lab Studies:  Lab Results   Component Value Date    WBC 5.0 07/27/2021    HGB 13.0 07/27/2021    HCT 38.6 07/27/2021    MCV 96.8 07/27/2021     07/27/2021     Lab Results   Component Value Date    GLUCOSE 90 07/27/2021    BUN 12 07/27/2021    CREATININE 0.7 07/27/2021    K 4.3 07/27/2021     07/27/2021     07/27/2021    CALCIUM 9.5 07/27/2021     No results found for: MG  No results found for: PHOS  Lab Results   Component Value Date    ALKPHOS 36 (L) 07/27/2021    ALT 11 07/27/2021    AST 14 (L) 07/27/2021    BILITOT 0.5 07/27/2021    PROT 7.1 07/27/2021     Review of Systems   Constitutional: Negative for activity change, appetite change, chills, fatigue and fever. HENT: Negative for congestion, ear discharge, ear pain, facial swelling, hearing loss, nosebleeds, postnasal drip, rhinorrhea, sinus pressure, sinus pain, sneezing, sore throat, tinnitus, trouble swallowing and voice change. Eyes: Negative for pain, redness, itching and visual disturbance. Respiratory: Negative for cough, choking and shortness of breath. Gastrointestinal: Negative for diarrhea and nausea. Endocrine: Negative for cold intolerance and heat intolerance.        Objective:   Physical Exam  Constitutional:       General: She is not in acute distress. Appearance: She is well-developed. HENT:      Head: Not macrocephalic and not microcephalic. No Abel's sign, contusion, right periorbital erythema, left periorbital erythema or laceration. Right Ear: Hearing, tympanic membrane, ear canal and external ear normal. No decreased hearing noted. No laceration, drainage, swelling or tenderness. No middle ear effusion. No foreign body. No mastoid tenderness. No hemotympanum. Tympanic membrane is not injected, perforated, retracted or bulging. Tympanic membrane has normal mobility. Left Ear: Hearing, tympanic membrane, ear canal and external ear normal. No decreased hearing noted. No laceration, drainage, swelling or tenderness. No middle ear effusion. No foreign body. No mastoid tenderness. No hemotympanum. Tympanic membrane is not injected, perforated, retracted or bulging. Tympanic membrane has normal mobility. Ears:      Rasmussen exam findings: does not lateralize. Right Rinne: AC > BC. Left Rinne: AC > BC. Nose: No mucosal edema or rhinorrhea. Mouth/Throat:      Pharynx: No oropharyngeal exudate or posterior oropharyngeal erythema. Tonsils: No tonsillar abscesses. Eyes:      Extraocular Movements:      Right eye: Normal extraocular motion and no nystagmus. Left eye: Normal extraocular motion and no nystagmus. Pupils:      Right eye: Pupil is reactive. Left eye: Pupil is reactive. Neck:      Thyroid: No thyroid mass or thyromegaly. Musculoskeletal:      Cervical back: Normal range of motion and neck supple. Lymphadenopathy:      Head:      Right side of head: No preauricular, posterior auricular or occipital adenopathy. Left side of head: No preauricular, posterior auricular or occipital adenopathy. Cervical:      Right cervical: No superficial, deep or posterior cervical adenopathy. Left cervical: No superficial, deep or posterior cervical adenopathy.    Skin:     Findings: No bruising, erythema or lesion. Neurological:      Mental Status: She is alert and oriented to person, place, and time. Psychiatric:         Attention and Perception: Attention normal.         Mood and Affect: Mood normal.         Speech: Speech normal.           Due to the patients chronic sinus disease and/or history of sinonasal neoplasm for surveillance a nasal endoscopy with or without debridement will be performed to complete a significant physical examination of the patient which cannot be performed by anterior rhinoscopy alone (failure of complete examination of the paranasal sinuses). Failure to provide this procedure may lead to late detection of significant chronic benign disease, acute exacerbation, resolution or failure of early diagnosis of recurrent cancer. The procedure report is present in the body of the chart. Nasal Endoscopy    Pre OP: sinusitis  Post OP: same  Reason: Persistent symptoms  Procedure: Nasal endoscopy  Surgeon: Benji Hughes  Anesthesia: Afrin with 2% lidocaine  Estimated Blood Loss: None      After obtaining verbal consent from the patient 1% lidocaine with afrin was sprayed into the nasal cavities. After allowing a time for anesthesia, a nasal endoscope was placed into the nostril. The septum, inferior, and middle turbinates were examined. The middle meatus, and sphenoethmoid recess was examined bilaterally. Cultures were not obtained from the sinuses. There were no complications. Pertinent positives included: There was edema and purulence in the left middle meatus. There was not edema and purulence at the right middle meatus. Polyps were not identified in the  sinuses. Masses were not identified. Tolerated well without complication. I attest that I was present for and did the entire procedure myself. Assessment:       Diagnosis Orders   1. Dysfunction of right eustachian tube     2.  Recurrent ethmoidal sinusitis  cefdinir (OMNICEF) 300 MG capsule

## 2022-06-02 ENCOUNTER — HOSPITAL ENCOUNTER (OUTPATIENT)
Dept: CT IMAGING | Age: 52
Discharge: HOME | End: 2022-06-02
Payer: COMMERCIAL

## 2022-06-02 DIAGNOSIS — J01.21 RECURRENT ETHMOIDAL SINUSITIS: ICD-10-CM

## 2022-06-02 PROCEDURE — 70486 CT MAXILLOFACIAL W/O DYE: CPT

## 2022-06-09 ENCOUNTER — OFFICE VISIT (OUTPATIENT)
Dept: ENT CLINIC | Age: 52
End: 2022-06-09
Payer: COMMERCIAL

## 2022-06-09 VITALS — SYSTOLIC BLOOD PRESSURE: 126 MMHG | HEART RATE: 74 BPM | DIASTOLIC BLOOD PRESSURE: 84 MMHG

## 2022-06-09 DIAGNOSIS — H69.81 DYSFUNCTION OF RIGHT EUSTACHIAN TUBE: ICD-10-CM

## 2022-06-09 DIAGNOSIS — J01.21 RECURRENT ETHMOIDAL SINUSITIS: Primary | ICD-10-CM

## 2022-06-09 DIAGNOSIS — J30.2 SEASONAL ALLERGIES: ICD-10-CM

## 2022-06-09 PROCEDURE — 99213 OFFICE O/P EST LOW 20 MIN: CPT | Performed by: OTOLARYNGOLOGY

## 2022-06-09 ASSESSMENT — ENCOUNTER SYMPTOMS
SINUS PAIN: 0
SHORTNESS OF BREATH: 0
SINUS PRESSURE: 0
TROUBLE SWALLOWING: 0
COUGH: 0
CHOKING: 0
RHINORRHEA: 0
EYE REDNESS: 0
NAUSEA: 0
VOICE CHANGE: 0
DIARRHEA: 0
EYE PAIN: 0
FACIAL SWELLING: 0
EYE ITCHING: 0
SORE THROAT: 0

## 2022-06-09 NOTE — PROGRESS NOTES
Subjective:      Patient ID: Dianna Rider is a 45 y.o. female. HPI  Chief Complaint   Patient presents with    Ear and sinus  History of Present Sherman Perales is a(n) 45 y.o. female who presents with follow up ear and sinus pressure. Imrpoved some on zyrtec. Was taking xyzal. No ear pain. Some reverberation in noisy environments.    Nasal Discharge: none  Nasal Obstruction: No   Post Nasal Drainage: No  Nasal Bleeding: No  Sense of Smell: normal    Patient Active Problem List   Diagnosis    On hormone replacement therapy     Past Surgical History:   Procedure Laterality Date    BACK SURGERY      jia placed for scoliosis age 14    COLONOSCOPY      Every 2-3 years    COLONOSCOPY N/A 7/23/2020    COLONOSCOPY WITH BIOPSY performed by Luis Enrique Olmedo MD at [address] N/A 7/23/2020    ESOPHAGEAL DILATION [place] performed by Luis Enrique Olmedo MD at [facility]      TONSILLECTOMY      UPPER GASTROINTESTINAL ENDOSCOPY N/A 7/23/2020    EGD BIOPSY performed by Luis Enrique Olmedo MD at [address] ENDOSCOPY     Family History   Problem Relation Age of Onset    Heart Disease Paternal Grandfather     Heart Attack Paternal Grandfather     Thyroid Disease Paternal Grandmother     No Known Problems Maternal Grandfather     Heart Disease Father     Cancer Father         skin    Rheum Arthritis Mother     No Known Problems Brother     Other Sister         chrones    No Known Problems Brother     No Known Problems Brother     Other Sister         chrones    Liver Disease Sister         hep c    No Known Problems Brother     No Known Problems Brother     No Known Problems Daughter     No Known Problems Son      Social History     Socioeconomic History    Marital status:      Spouse name: Not on file    Number of children: Not on file    Years of education: Not on file    Highest education level: Not on file   Occupational History    Not on file   Tobacco Use    Smoking status: Never Smoker    Smokeless tobacco: Never Used   Vaping Use    Vaping Use: Never used   Substance and Sexual Activity    Alcohol use: Yes     Comment: 2-3 times per week    Drug use: Never    Sexual activity: Yes     Partners: Male   Other Topics Concern    Not on file   Social History Narrative    Not on file     Social Determinants of Health     Financial Resource Strain: Low Risk     Difficulty of Paying Living Expenses: Not hard at all   Food Insecurity: No Food Insecurity    Worried About [facility] in the Last Year: Never true    [address] [facility] in the Last Year: Never true   Transportation Needs:     Lack of Transportation (Medical): Not on file    Lack of Transportation (Non-Medical): Not on file   Physical Activity:     Days of Exercise per Week: Not on file    Minutes of Exercise per Session: Not on file   Stress:     Feeling of Stress : Not on file   Social Connections:     Frequency of Communication with Friends and Family: Not on file    Frequency of Social Gatherings with Friends and Family: Not on file    Attends Yarsani Services: Not on file    Active Member of [address] [place] [address] or Organizations: Not on file    Attends Club or Organization Meetings: Not on file    Marital Status: Not on file   Intimate Partner Violence:     Fear of Current or Ex-Partner: Not on file    Emotionally Abused: Not on file    Physically Abused: Not on file    Sexually Abused: Not on file   Housing Stability:     Unable to Pay for Housing in the Last Year: Not on file    Number of [place] in the Last Year: Not on file    Unstable Housing in the Last Year: Not on file       DRUG/FOOD ALLERGIES: Sulfa antibiotics, Tetanus-diphtheria toxoids td, and Sulfamethoxazole-trimethoprim    CURRENT MEDICATIONS  Prior to Admission medications    Medication Sig Start Date End Date Taking?  Authorizing Provider   cefdinir (OMNICEF) 300 MG capsule Take 1 capsule by mouth 2 times daily for 21 days 5/19/22 6/9/22 Yes Zoe Villa MD   predniSONE (DELTASONE) 10 MG tablet 4 tabs a day for 5 days, 3 tabs a day for 3 days, 2 tabs a day for 3 days,1 tab a day for 3 days 5/19/22  Yes Zoe Villa MD   mesalamine (LIALDA) 1.2 g EC tablet Take 1 tablet by mouth daily 10/5/19  Yes Historical Provider, MD   progesterone (PROMETRIUM) 200 MG capsule Take 200 mg by mouth daily   Yes Historical Provider, MD   buPROPion (WELLBUTRIN XL) 150 MG extended release tablet Take 150 mg by mouth   Yes Historical Provider, MD         Lab Studies:  Lab Results   Component Value Date    WBC 5.0 07/27/2021    HGB 13.0 07/27/2021    HCT 38.6 07/27/2021    MCV 96.8 07/27/2021     07/27/2021     Lab Results   Component Value Date    GLUCOSE 90 07/27/2021    BUN 12 07/27/2021    CREATININE 0.7 07/27/2021    K 4.3 07/27/2021     07/27/2021     07/27/2021    CALCIUM 9.5 07/27/2021     No results found for: MG  No results found for: PHOS  Lab Results   Component Value Date    ALKPHOS 36 (L) 07/27/2021    ALT 11 07/27/2021    AST 14 (L) 07/27/2021    BILITOT 0.5 07/27/2021    PROT 7.1 07/27/2021     Review of Systems   Constitutional: Negative for activity change, appetite change, chills, fatigue and fever. HENT: Positive for congestion. Negative for ear discharge, ear pain, facial swelling, hearing loss, nosebleeds, postnasal drip, rhinorrhea, sinus pressure, sinus pain, sneezing, sore throat, tinnitus, trouble swallowing and voice change. Eyes: Negative for pain, redness, itching and visual disturbance. Respiratory: Negative for cough, choking and shortness of breath. Gastrointestinal: Negative for diarrhea and nausea. Endocrine: Negative for cold intolerance and heat intolerance. Objective:   Physical Exam  Constitutional:       General: She is not in acute distress. Appearance: She is well-developed.    HENT:      Head: Not macrocephalic and not microcephalic. No Abel's sign, abrasion, contusion, right periorbital erythema, left periorbital erythema or laceration. Right Ear: Hearing, tympanic membrane, ear canal and external ear normal. No decreased hearing noted. No laceration, drainage, swelling or tenderness. No middle ear effusion. No foreign body. No mastoid tenderness. No hemotympanum. Tympanic membrane is not injected, perforated, retracted or bulging. Tympanic membrane has normal mobility. Left Ear: Hearing, tympanic membrane, ear canal and external ear normal. No decreased hearing noted. No laceration, drainage, swelling or tenderness. No middle ear effusion. No foreign body. No mastoid tenderness. No hemotympanum. Tympanic membrane is not injected, perforated, retracted or bulging. Tympanic membrane has normal mobility. Ears:      Rasmussen exam findings: does not lateralize. Right Rinne: AC > BC. Left Rinne: AC > BC. Nose: No nasal deformity, septal deviation, laceration, mucosal edema or rhinorrhea. Right Nostril: No epistaxis or occlusion. Left Nostril: No epistaxis or occlusion. Right Turbinates: Not enlarged, swollen or pale. Left Turbinates: Not enlarged, swollen or pale. Right Sinus: No maxillary sinus tenderness or frontal sinus tenderness. Left Sinus: No maxillary sinus tenderness or frontal sinus tenderness. Mouth/Throat:      Lips: No lesions. Mouth: Mucous membranes are moist.      Tongue: No lesions. Palate: No mass. Pharynx: Uvula midline. No oropharyngeal exudate or posterior oropharyngeal erythema. Tonsils: No tonsillar abscesses. Eyes:      General:         Right eye: No discharge. Left eye: No discharge. Extraocular Movements:      Right eye: Normal extraocular motion and no nystagmus. Left eye: Normal extraocular motion and no nystagmus. Conjunctiva/sclera:      Right eye: Right conjunctiva is not injected.  No chemosis or exudate. Left eye: Left conjunctiva is not injected. No chemosis or exudate. Pupils:      Right eye: Pupil is reactive. Left eye: Pupil is reactive. Neck:      Thyroid: No thyroid mass or thyromegaly. Cardiovascular:      Rate and Rhythm: Normal rate and regular rhythm. Pulmonary:      Effort: No tachypnea or respiratory distress. Musculoskeletal:      Cervical back: Normal range of motion and neck supple. Lymphadenopathy:      Head:      Right side of head: No preauricular, posterior auricular or occipital adenopathy. Left side of head: No preauricular, posterior auricular or occipital adenopathy. Cervical:      Right cervical: No superficial, deep or posterior cervical adenopathy. Left cervical: No superficial, deep or posterior cervical adenopathy. Skin:     Findings: No bruising, erythema or lesion. Neurological:      Mental Status: She is alert and oriented to person, place, and time. Psychiatric:         Attention and Perception: Attention normal.         Mood and Affect: Mood normal.         Speech: Speech normal.         Assessment:       Diagnosis Orders   1. Recurrent ethmoidal sinusitis     2. Dysfunction of right eustachian tube     3. Seasonal allergies             Plan:      No evidence of ear infection. Reviewed CT. No sinus disease. Consider new allergy testing. Follow up in 3 months.          Beau Hooper MD

## 2022-07-21 ENCOUNTER — OFFICE VISIT (OUTPATIENT)
Dept: FAMILY MEDICINE CLINIC | Age: 52
End: 2022-07-21
Payer: COMMERCIAL

## 2022-07-21 VITALS
DIASTOLIC BLOOD PRESSURE: 58 MMHG | HEIGHT: 68 IN | TEMPERATURE: 97.8 F | SYSTOLIC BLOOD PRESSURE: 100 MMHG | OXYGEN SATURATION: 98 % | BODY MASS INDEX: 21.07 KG/M2 | HEART RATE: 89 BPM | WEIGHT: 139 LBS

## 2022-07-21 DIAGNOSIS — L25.9 CONTACT DERMATITIS, UNSPECIFIED CONTACT DERMATITIS TYPE, UNSPECIFIED TRIGGER: ICD-10-CM

## 2022-07-21 DIAGNOSIS — R21 RASH: Primary | ICD-10-CM

## 2022-07-21 PROCEDURE — 99213 OFFICE O/P EST LOW 20 MIN: CPT | Performed by: NURSE PRACTITIONER

## 2022-07-21 RX ORDER — PREDNISONE 10 MG/1
10 TABLET ORAL 2 TIMES DAILY
Qty: 10 TABLET | Refills: 0 | Status: SHIPPED | OUTPATIENT
Start: 2022-07-21 | End: 2022-07-26

## 2022-07-21 ASSESSMENT — ENCOUNTER SYMPTOMS
EYE DISCHARGE: 0
STRIDOR: 0
VOMITING: 0
DIARRHEA: 0
SORE THROAT: 0
SINUS PAIN: 0
TROUBLE SWALLOWING: 0
BLOOD IN STOOL: 0
SINUS PRESSURE: 0
COLOR CHANGE: 1
BACK PAIN: 0
CHOKING: 0
EYE PAIN: 0
EYE ITCHING: 0
WHEEZING: 0
ABDOMINAL PAIN: 0
SHORTNESS OF BREATH: 0
CONSTIPATION: 0
NAUSEA: 0
CHEST TIGHTNESS: 0
EYE REDNESS: 0
COUGH: 0
RHINORRHEA: 0
VOICE CHANGE: 0
PHOTOPHOBIA: 0

## 2022-07-21 ASSESSMENT — PATIENT HEALTH QUESTIONNAIRE - PHQ9
1. LITTLE INTEREST OR PLEASURE IN DOING THINGS: 0
SUM OF ALL RESPONSES TO PHQ9 QUESTIONS 1 & 2: 0
SUM OF ALL RESPONSES TO PHQ QUESTIONS 1-9: 0
2. FEELING DOWN, DEPRESSED OR HOPELESS: 0

## 2022-07-21 NOTE — PROGRESS NOTES
Chief Complaint   Patient presents with    Allergic Reaction     Right side of face       BP (!) 100/58 (Site: Left Upper Arm, Position: Sitting, Cuff Size: Medium Adult)   Pulse 89   Temp 97.8 °F (36.6 °C)   Ht 5' 8\" (1.727 m)   Wt 139 lb (63 kg)   SpO2 98%   BMI 21.13 kg/m²     HPI:  Nitesh Ruiz is a 45 y.o. (: 1970) here today   for   HPI    Patient's medications, allergies, past medical, surgical, social and family histories were reviewed and updated as appropriate. Allergic reaction: 2 weeks ago had right facial swelling and itching, returned last night. She had tried benadryl, and steroids, Went down after two days and returned las night. Denies new detergents, was at a hotel last time, but was home last night. Has not been able to find the source, has allergist, but switching Aug 11th. Recent tubes in ears due to ear fullness. She will do Pepcid daily for 2 weeks and Claritin daily, benadryl nightly. She will stop using steroid cream on her face. Occasionally can thin skin. She will try prescribed prednisone 10 mg twice daily for 5 days as well. She will keep her appointment with the allergist . ROS:  Review of Systems   Constitutional:  Negative for activity change, appetite change, chills, diaphoresis, fatigue, fever and unexpected weight change. HENT:  Negative for congestion, ear discharge, ear pain, hearing loss, nosebleeds, postnasal drip, rhinorrhea, sinus pressure, sinus pain, sneezing, sore throat, tinnitus, trouble swallowing and voice change. Eyes:  Negative for photophobia, pain, discharge, redness and itching. Respiratory:  Negative for cough, choking, chest tightness, shortness of breath, wheezing and stridor. Cardiovascular:  Negative for chest pain, palpitations and leg swelling. Gastrointestinal:  Negative for abdominal pain, blood in stool, constipation, diarrhea, nausea and vomiting.    Endocrine: Negative for cold intolerance, heat

## 2022-08-22 ENCOUNTER — OFFICE VISIT (OUTPATIENT)
Dept: FAMILY MEDICINE CLINIC | Age: 52
End: 2022-08-22
Payer: COMMERCIAL

## 2022-08-22 VITALS
DIASTOLIC BLOOD PRESSURE: 70 MMHG | HEIGHT: 68 IN | HEART RATE: 73 BPM | BODY MASS INDEX: 21.07 KG/M2 | SYSTOLIC BLOOD PRESSURE: 128 MMHG | OXYGEN SATURATION: 98 % | WEIGHT: 139 LBS

## 2022-08-22 DIAGNOSIS — K50.90 CROHN'S DISEASE WITHOUT COMPLICATION, UNSPECIFIED GASTROINTESTINAL TRACT LOCATION (HCC): ICD-10-CM

## 2022-08-22 DIAGNOSIS — R60.9 SWELLING: ICD-10-CM

## 2022-08-22 DIAGNOSIS — Z00.00 WELL ADULT EXAM: Primary | ICD-10-CM

## 2022-08-22 PROCEDURE — 99396 PREV VISIT EST AGE 40-64: CPT | Performed by: FAMILY MEDICINE

## 2022-08-22 RX ORDER — DOXEPIN HYDROCHLORIDE 10 MG/1
1 CAPSULE ORAL DAILY
COMMUNITY
Start: 2022-08-11

## 2022-08-22 SDOH — ECONOMIC STABILITY: FOOD INSECURITY: WITHIN THE PAST 12 MONTHS, THE FOOD YOU BOUGHT JUST DIDN'T LAST AND YOU DIDN'T HAVE MONEY TO GET MORE.: NEVER TRUE

## 2022-08-22 SDOH — ECONOMIC STABILITY: FOOD INSECURITY: WITHIN THE PAST 12 MONTHS, YOU WORRIED THAT YOUR FOOD WOULD RUN OUT BEFORE YOU GOT MONEY TO BUY MORE.: NEVER TRUE

## 2022-08-22 ASSESSMENT — ENCOUNTER SYMPTOMS: RESPIRATORY NEGATIVE: 1

## 2022-08-22 ASSESSMENT — SOCIAL DETERMINANTS OF HEALTH (SDOH): HOW HARD IS IT FOR YOU TO PAY FOR THE VERY BASICS LIKE FOOD, HOUSING, MEDICAL CARE, AND HEATING?: NOT HARD AT ALL

## 2022-08-22 NOTE — PROGRESS NOTES
Tabitha Gonzalez (:  1970) is a 45 y.o. female,Established patient, here for evaluation of the following chief complaint(s): Annual Exam         ASSESSMENT/PLAN:  Gamaliel Gill was seen today for annual exam.    Diagnoses and all orders for this visit:    Well adult exam  -     Lipid Panel; Future  -     Comprehensive Metabolic Panel; Future  -     CBC; Future  -     TSH with Reflex; Future  -     Sedimentation Rate; Future  -     C-Reactive Protein; Future  Good diet and exercise  Swelling  -     Sedimentation Rate; Future  -     C-Reactive Protein; Future  blood work to [facility] [employer] disease without complication, [facility]  -     Sedimentation Rate; Future  -     C-Reactive Protein; Future   Stable; follows with GI    No follow-ups on file. Subjective   SUBJECTIVE/OBJECTIVE:  STEVE Kee is a of 45 y.o. female comes in today with   Chief Complaint   Patient presents with    Annual Exam     Right sided face swelling this summer. No relief with antibiotics and steroid  Nose sprays, antihistamines, and doxepin has not helped. Allergy testing neg  Saw ent and allergy. Had a tube placed which did not help. Hearing test was normal.    Vitals:    22 1124   BP: 128/70   Site: Left Upper Arm   Position: Sitting   Cuff Size: Small Adult   Pulse: 73   SpO2: 98%   Weight: 139 lb (63 kg)   Height: 5' 8\" (1.727 m)        Review of Systems   Constitutional: Negative. Respiratory: Negative. Cardiovascular: Negative. Psychiatric/Behavioral: Negative. Objective   Physical Exam  Constitutional:       General: She is not in acute distress. Appearance: Normal appearance. She is well-developed and normal weight. She is not diaphoretic. HENT:      Head: Normocephalic and atraumatic. Eyes:      General: No scleral icterus. Neck:      Thyroid: No thyroid mass or thyromegaly. Trachea: No tracheal deviation.    Cardiovascular:      Rate and Rhythm: Normal rate and regular rhythm. Heart sounds: Normal heart sounds. No murmur heard. Pulmonary:      Effort: Pulmonary effort is normal.      Breath sounds: Normal breath sounds. Musculoskeletal:      Cervical back: Normal range of motion and neck supple. Lymphadenopathy:      Head:      Right side of head: No submandibular adenopathy. Left side of head: No submandibular adenopathy. Cervical: No cervical adenopathy. Skin:     General: Skin is warm and dry. Findings: No rash. Neurological:      Mental Status: She is alert and oriented to person, place, and time. Cranial Nerves: No cranial nerve deficit. Psychiatric:         Behavior: Behavior normal.         Thought Content: Thought content normal.         Judgment: Judgment normal.            An electronic signature was used to authenticate this note.     --Comfort Quinn MD

## 2022-08-25 DIAGNOSIS — Z00.00 WELL ADULT EXAM: ICD-10-CM

## 2022-08-25 DIAGNOSIS — R60.9 SWELLING: ICD-10-CM

## 2022-08-25 DIAGNOSIS — K50.90 CROHN'S DISEASE WITHOUT COMPLICATION, UNSPECIFIED GASTROINTESTINAL TRACT LOCATION (HCC): ICD-10-CM

## 2022-08-25 LAB
A/G RATIO: 1.8 (ref 1.1–2.2)
ALBUMIN SERPL-MCNC: 4.6 G/DL (ref 3.4–5)
ALP BLD-CCNC: 40 U/L (ref 40–129)
ALT SERPL-CCNC: 13 U/L (ref 10–40)
ANION GAP SERPL CALCULATED.3IONS-SCNC: 11 MMOL/L (ref 3–16)
AST SERPL-CCNC: 17 U/L (ref 15–37)
BILIRUB SERPL-MCNC: 0.3 MG/DL (ref 0–1)
BUN BLDV-MCNC: 16 MG/DL (ref 7–20)
C-REACTIVE PROTEIN: <3 MG/L (ref 0–5.1)
CALCIUM SERPL-MCNC: 9.3 MG/DL (ref 8.3–10.6)
CHLORIDE BLD-SCNC: 104 MMOL/L (ref 99–110)
CHOLESTEROL, TOTAL: 193 MG/DL (ref 0–199)
CO2: 24 MMOL/L (ref 21–32)
CREAT SERPL-MCNC: 0.8 MG/DL (ref 0.6–1.1)
GFR AFRICAN AMERICAN: >60
GFR NON-AFRICAN AMERICAN: >60
GLUCOSE BLD-MCNC: 93 MG/DL (ref 70–99)
HCT VFR BLD CALC: 39.2 % (ref 36–48)
HDLC SERPL-MCNC: 47 MG/DL (ref 40–60)
HEMOGLOBIN: 13.2 G/DL (ref 12–16)
LDL CHOLESTEROL CALCULATED: 131 MG/DL
MCH RBC QN AUTO: 32.2 PG (ref 26–34)
MCHC RBC AUTO-ENTMCNC: 33.8 G/DL (ref 31–36)
MCV RBC AUTO: 95.2 FL (ref 80–100)
PDW BLD-RTO: 13.1 % (ref 12.4–15.4)
PLATELET # BLD: 330 K/UL (ref 135–450)
PMV BLD AUTO: 7.2 FL (ref 5–10.5)
POTASSIUM SERPL-SCNC: 4.7 MMOL/L (ref 3.5–5.1)
RBC # BLD: 4.12 M/UL (ref 4–5.2)
SEDIMENTATION RATE, ERYTHROCYTE: 13 MM/HR (ref 0–30)
SODIUM BLD-SCNC: 139 MMOL/L (ref 136–145)
TOTAL PROTEIN: 7.1 G/DL (ref 6.4–8.2)
TRIGL SERPL-MCNC: 76 MG/DL (ref 0–150)
TSH REFLEX: 2.33 UIU/ML (ref 0.27–4.2)
VLDLC SERPL CALC-MCNC: 15 MG/DL
WBC # BLD: 4.9 K/UL (ref 4–11)

## 2022-09-22 ENCOUNTER — OFFICE VISIT (OUTPATIENT)
Dept: ORTHOPEDIC SURGERY | Age: 52
End: 2022-09-22
Payer: COMMERCIAL

## 2022-09-22 VITALS — WEIGHT: 139 LBS | HEIGHT: 68 IN | BODY MASS INDEX: 21.07 KG/M2

## 2022-09-22 DIAGNOSIS — M25.561 RIGHT KNEE PAIN, UNSPECIFIED CHRONICITY: ICD-10-CM

## 2022-09-22 DIAGNOSIS — M17.11 PRIMARY OSTEOARTHRITIS OF RIGHT KNEE: Primary | ICD-10-CM

## 2022-09-22 PROCEDURE — 99212 OFFICE O/P EST SF 10 MIN: CPT | Performed by: PHYSICIAN ASSISTANT

## 2022-09-22 NOTE — PROGRESS NOTES
injections in this patient. The patient denies any clicking, popping, or locking of the joint. The patient denies any numbness, paresthesias, or weakness.            Past Medical History:   Diagnosis Date    Abnormal Pap smear of cervix     Anxiety     Autoimmune disorder ([facility] [address]     Crohn disease ([facility] [address]     Depression     Menopausal symptoms     Scoliosis         Past Surgical History:   Procedure Laterality Date    BACK SURGERY      jia placed for scoliosis age 13    COLONOSCOPY      Every 2-3 years    COLONOSCOPY N/A 7/23/2020    COLONOSCOPY WITH BIOPSY performed by Ana María Patel MD at [address] N/A 7/23/2020    [address] performed by Ana María Patel MD at [address] [facility] ENDOSCOPY N/A 7/23/2020    EGD BIOPSY performed by Ana María Patel MD at [facility] ENDOSCOPY       Family History   Problem Relation Age of Onset    Heart Disease Paternal Grandfather     Heart Attack Paternal Grandfather     Thyroid Disease Paternal Grandmother     No Known Problems Maternal Grandfather     Heart Disease Father     Cancer Father         skin    Rheum Arthritis Mother     No Known Problems Brother     Other Sister         chrones    No Known Problems Brother     No Known Problems Brother     Other Sister         chrones    Liver Disease Sister         hep c    No Known Problems Brother     No Known Problems Brother     No Known Problems Daughter     No Known Problems Son        Social History     Socioeconomic History    Marital status:      Spouse name: None    Number of children: None    Years of education: None    Highest education level: None   Tobacco Use    Smoking status: Never    Smokeless tobacco: Never   Vaping Use    Vaping Use: Never used   Substance and Sexual Activity    Alcohol use: Yes     Comment: 2-3 times per week    Drug use: Never    Sexual activity: Yes     Partners: Male     Social Determinants of Health     Financial Resource Strain: Low Risk     Difficulty of Paying Living Expenses: Not hard at all   Food Insecurity: No Food Insecurity    Worried About [facility] in the Last Year: Never true    Ran Out of Food in the Last Year: Never true       Current Outpatient Medications   Medication Sig Dispense Refill    doxepin (SINEQUAN) 10 MG capsule Take 1 capsule by mouth daily      mesalamine (LIALDA) 1.2 g EC tablet Take 1 tablet by mouth daily  11    progesterone (PROMETRIUM) 200 MG capsule Take 200 mg by mouth daily      buPROPion (WELLBUTRIN XL) 150 MG extended release tablet Take 150 mg by mouth       No current facility-administered medications for this visit. Allergies   Allergen Reactions    Sulfa Antibiotics Hives and Rash    Tetanus-Diphtheria Toxoids Td      Fever, myalgias    Sulfamethoxazole-Trimethoprim Hives and Rash         Review of Systems:  A 14 point review of systems was completed by the patient and is available in the media section of the scanned medical record and was reviewed. Vital Signs:   Ht 5' 8\" (1.727 m)   Wt 139 lb (63 kg)   BMI 21.13 kg/m²     General Exam:    General: Tabitha Gonzalez is a healthy and well appearing 45y.o. year old female who is sitting comfortably in our office in no acute distress. Neuro: Alert & Oriented x 3,  normal,  no focal deficits noted. Normal mood & affect. Eyes: Sclera clear  Ears: Normal external ear  Mouth: Patient wearing a mask  Pulm: Respirations unlabored and regular   Skin: Warm, well perfused      Knee Examination: Right    Inspection:  No effusion, ecchymosis, discoloration, erythema, excessive warmth. no gross deformities, no signs of infection. Palpation: There is mild patellofemoral crepitus, there is no joint line tenderness. No other osseous or soft tissue tenderness around the knee. Negative calf tenderness    Range of Motion: 0-135 degrees without pain or difficulty. Appropriate patellar mobility. Strength: Grossly intact    Special Tests: No ligament instability, valgus and varus stress test are stable at 0 and 30°. Lachman's exhibits a solid endpoint. Negative posterior drawer. Negative Homans sign    Gait: Non antalgic, without the use of assistive devices    Neuro: Sensation equal bilateral lower extremities    Vascular: 2+ posterior tibialis pulse        Radiology:   Previously obtain imaging reviewed. X-rays obtained and reviewed in office: AP and merchant view of both knees and a lateral of the right. Impression: No fractures, dislocations, visible tumors, or signs of acute trauma. The medial and lateral femoral-tibial compartments exhibit mildly decreased joint space bilaterally. Patellas exhibit lateral subluxation with bone-on-bone arthritis noted over the lateral femoral condyle and lateral patellar facet articulation. Office Procedures:  Orders Placed This Encounter   Procedures    XR KNEE RIGHT (3 VIEWS)     Standing Status:   Future     Number of Occurrences:   1     Standing Expiration Date:   9/22/2023     Order Specific Question:   Reason for exam:     Answer:   right knee pain    PA SYNVISC OR SYNVISC-ONE     Standing Status:   Future     Standing Expiration Date:   9/22/2023            Assessment: This patient is a 45 y.o. female presenting to the office for follow-up evaluation of her right knee pain. This patient is being treated conservatively for the arthritis in her right knee. Encounter Diagnoses   Name Primary? Primary osteoarthritis of right knee Yes    Right knee pain, unspecified chronicity        No orders of the defined types were placed in this encounter. Medical decision making:  Patient's workup and evaluation were reviewed with the patient in the office today. Imaging was reviewed with the patient. The patient exhibits great relief with viscosupplementation injections.   Given that they work well for the patient, providing between 6 months and over a year of relief, I believe she has a medical necessity to once again receive these injections. We will pre-CERT these with her insurance. The patient was otherwise educated on conservative treatment for her arthritis as detailed below. All the patient's questions were answered while in the clinic. The patient is understanding of all instructions and agrees with the plan. Treatment Plan:    Pre-CERT viscosupplementation injections  Activity modification/Rest   Ice 20 minutes ever 1-2 hours PRN  Take medications as prescribed/instructed  Elevation   Lightweight exercise/low impact exercise  Appropriate diet/weight management      Follow up: As needed        Ankur Garcia PA-C    Physician Assistant - Certified    59/69/71 11:06 AM        This dictation was performed with a verbal recognition program (DRAGON) and it was checked for errors. It is possible that there are still dictated errors within this office note. If so, please bring any errors to my attention for an addendum. All efforts were made to ensure that this office note is accurate. This patient exhibits moderate complexity for obtaining an independent history, reviewing past medical records, independent interpretation/review of diagnostic imaging, and further coordinating care. The patient exhibits low risk given that the patient is being treated conservatively at this time. Approximately 30 minutes were spent reviewing past medical records, imaging, educating the patient, and coordinating care.

## 2022-10-03 DIAGNOSIS — M25.561 CHRONIC PAIN OF RIGHT KNEE: ICD-10-CM

## 2022-10-03 DIAGNOSIS — M25.561 RIGHT KNEE PAIN, UNSPECIFIED CHRONICITY: ICD-10-CM

## 2022-10-03 DIAGNOSIS — G89.29 CHRONIC PAIN OF RIGHT KNEE: ICD-10-CM

## 2022-10-03 DIAGNOSIS — M17.11 PRIMARY OSTEOARTHRITIS OF RIGHT KNEE: Primary | ICD-10-CM

## 2022-10-12 ENCOUNTER — TELEPHONE (OUTPATIENT)
Dept: ORTHOPEDIC SURGERY | Age: 52
End: 2022-10-12

## 2022-10-12 NOTE — TELEPHONE ENCOUNTER
Called and LVM that Synvisc injection had been approved for R knee. Can call and schedule at her convenience.

## 2022-10-31 LAB
BASOPHILS ABSOLUTE: 0.1 K/UL (ref 0–0.2)
BASOPHILS RELATIVE PERCENT: 0.5 %
EOSINOPHILS ABSOLUTE: 0 K/UL (ref 0–0.6)
EOSINOPHILS RELATIVE PERCENT: 0.2 %
HCT VFR BLD CALC: 37.7 % (ref 36–48)
HEMOGLOBIN: 12.8 G/DL (ref 12–16)
LYMPHOCYTES ABSOLUTE: 1.5 K/UL (ref 1–5.1)
LYMPHOCYTES RELATIVE PERCENT: 13.4 %
MCH RBC QN AUTO: 31.9 PG (ref 26–34)
MCHC RBC AUTO-ENTMCNC: 34.1 G/DL (ref 31–36)
MCV RBC AUTO: 93.5 FL (ref 80–100)
MONOCYTES ABSOLUTE: 0.5 K/UL (ref 0–1.3)
MONOCYTES RELATIVE PERCENT: 4 %
NEUTROPHILS ABSOLUTE: 9.3 K/UL (ref 1.7–7.7)
NEUTROPHILS RELATIVE PERCENT: 81.9 %
PDW BLD-RTO: 14 % (ref 12.4–15.4)
PLATELET # BLD: 372 K/UL (ref 135–450)
PMV BLD AUTO: 7.5 FL (ref 5–10.5)
RBC # BLD: 4.03 M/UL (ref 4–5.2)
WBC # BLD: 11.4 K/UL (ref 4–11)

## 2022-10-31 NOTE — PROGRESS NOTES
Chief Complaint    Knee Pain (Right knee synvisc)      History of Present Illness:  Lucia Silver is a 45 y.o. female who presents for presenting to the office for a viscosupplementation injection. This patient is known to Rowenak[place]in Vasquez [place] [address] and is being treated conservatively for the arthritis in her right knee. This conservative treatment includes: rest, ice, elevation, bracing, home exercises, activity modification, NSAIDs as needed, corticosteroid injections and visco supplementation injections. The patient feels that her symptoms are improving. She denies any walking intolerance, mechanical symptoms or sensations of instability. The patient denies any new injury. The patient denies any numbness, paresthesias, or weakness. History from the patient's last visit on 09/22/2022. Lucia Silver is a 45 y.o. female who presents for follow-up evaluation of her right knee pain. This patient is known to [place] [address] and is being treated conservatively for the arthritis in her right knee. This conservative treatment includes: rest, ice, elevation, bracing, home exercises, activity modification, NSAIDs as needed, corticosteroid injections and visco supplementation injections. She is increasing her physical activity via a dance competition that she will be starting within the next week or 2. She has noticed an insidious return of her baseline osteoarthritic symptoms prior to the beginning of this competition. The patient denies any new injury. The patient denies any catching, giving way, joint locking, numbness, paresthesias, or weakness.      Pain Assessment  Location of Pain: Knee  Location Modifiers: Right  Severity of Pain: 0  Duration of Pain: A few minutes  Frequency of Pain: Intermittent  Limiting Behavior: No  Result of Injury: No  Work-Related Injury: No  Are there other pain locations you wish to document?: No      Physical exam:  Inspection: Both knees without erythema, ecchymosis, discoloration, abrasion, contusions, deformity or signs of infection. Palpation: There is no tenderness to palpation to the joint line of both knees. There is mild patellofemoral crepitus palpable in the right knee. No tenderness to palpation to any other osseous or soft tissue structures of the knee. Range of motion: Grossly intact  Neurovascular: Patient is neurovascularly intact, equally bilaterally. 2+ dorsal pedal pulses. Procedures:    VISCO SUPPLEMENTATION  INJECTION: Right KNEE    PROCEDURE: Risks, benefits, and alternatives to the injections were discussed in detail with the patient. The risks discussed included but are not limited to infection, skin reactions, hot swollen joints, and anaphylaxis. After informed consent was provided, the patient sat on the exam table. The anterior lateral aspect of the right knee was prepped with Chlora-prep. The skin and subcutaneous tissues were anesthetized with ethyl chloride spray and 1% lidocaine injected with a 20-gauge needle. The needle was left in place and the syringe was detached from the needle. The Synvisc One syringe was attached to the 20-gauge needle and 48 mg of the Synvisc One was injected into the knee without resistance. The needle was withdrawn and the puncture site sealed with a Band-Aid. The patient tolerated the procedure well. Patient was educated on postinjection precautions. (Conducted by Pawel Perry PA-C)      Assessment: Roney Ragsdale is a 45 y.o. female who presents for viscosupplementation injection. This patient is being treated conservatively for the arthritis in her right knee. Encounter Diagnoses   Name Primary?     Primary osteoarthritis of right knee Yes    Chronic pain of right knee          Treatment plan:  Observe postinjection precautions  Rest   Ice 20 minutes ever 1-2 hours PRN  Utilize medications as prescribed  Activity modification  Lightweight exercise/low impact exercise  Appropriate diet/weight jamie Maradiaga, [place]  11/01/22 3:11 PM  Physician Assistant - Certified         This dictation was performed with a verbal recognition program (DRAGON) and it was checked for errors. It is possible that there are still dictated errors within this office note. If so, please bring any errors to my attention for an addendum. All efforts were made to ensure that this office note is accurate.

## 2022-11-01 ENCOUNTER — OFFICE VISIT (OUTPATIENT)
Dept: ORTHOPEDIC SURGERY | Age: 52
End: 2022-11-01
Payer: COMMERCIAL

## 2022-11-01 VITALS — BODY MASS INDEX: 21.07 KG/M2 | WEIGHT: 139 LBS | HEIGHT: 68 IN

## 2022-11-01 DIAGNOSIS — M17.11 PRIMARY OSTEOARTHRITIS OF RIGHT KNEE: Primary | ICD-10-CM

## 2022-11-01 DIAGNOSIS — G89.29 CHRONIC PAIN OF RIGHT KNEE: ICD-10-CM

## 2022-11-01 DIAGNOSIS — M25.561 CHRONIC PAIN OF RIGHT KNEE: ICD-10-CM

## 2022-11-01 LAB
A/G RATIO: 1.8 (ref 1.1–2.2)
ALBUMIN SERPL-MCNC: 4.6 G/DL (ref 3.4–5)
ALP BLD-CCNC: 44 U/L (ref 40–129)
ALT SERPL-CCNC: 12 U/L (ref 10–40)
ANION GAP SERPL CALCULATED.3IONS-SCNC: 16 MMOL/L (ref 3–16)
AST SERPL-CCNC: 11 U/L (ref 15–37)
BILIRUB SERPL-MCNC: 0.3 MG/DL (ref 0–1)
BUN BLDV-MCNC: 11 MG/DL (ref 7–20)
C-REACTIVE PROTEIN, HIGH SENSITIVITY: 1.62 MG/L (ref 0.16–3)
CALCIUM SERPL-MCNC: 9.8 MG/DL (ref 8.3–10.6)
CHLORIDE BLD-SCNC: 105 MMOL/L (ref 99–110)
CO2: 22 MMOL/L (ref 21–32)
CREAT SERPL-MCNC: 0.9 MG/DL (ref 0.6–1.1)
GFR SERPL CREATININE-BSD FRML MDRD: >60 ML/MIN/{1.73_M2}
GLUCOSE BLD-MCNC: 95 MG/DL (ref 70–99)
POTASSIUM SERPL-SCNC: 4.1 MMOL/L (ref 3.5–5.1)
SODIUM BLD-SCNC: 143 MMOL/L (ref 136–145)
TOTAL PROTEIN: 7.2 G/DL (ref 6.4–8.2)
WHITE BLOOD CELLS (WBC), STOOL: NORMAL

## 2022-11-01 PROCEDURE — 99999 PR OFFICE/OUTPT VISIT,PROCEDURE ONLY: CPT | Performed by: PHYSICIAN ASSISTANT

## 2022-11-01 PROCEDURE — 20610 DRAIN/INJ JOINT/BURSA W/O US: CPT | Performed by: PHYSICIAN ASSISTANT

## 2022-11-01 RX ORDER — LIDOCAINE HYDROCHLORIDE 10 MG/ML
3 INJECTION, SOLUTION EPIDURAL; INFILTRATION; INTRACAUDAL; PERINEURAL ONCE
Status: COMPLETED | OUTPATIENT
Start: 2022-11-01 | End: 2022-11-01

## 2022-11-01 RX ADMIN — LIDOCAINE HYDROCHLORIDE 3 ML: 10 INJECTION, SOLUTION EPIDURAL; INFILTRATION; INTRACAUDAL; PERINEURAL at 13:55

## 2022-11-02 LAB
CRYPTOSPORIDIUM ANTIGEN STOOL: NORMAL
CRYPTOSPORIDIUM ANTIGEN STOOL: NORMAL
GI BACTERIAL PATHOGENS BY PCR: NORMAL
GIARDIA ANTIGEN STOOL: NORMAL

## 2022-11-03 ENCOUNTER — HOSPITAL ENCOUNTER (OUTPATIENT)
Dept: WOMENS IMAGING | Age: 52
Discharge: HOME | End: 2022-11-03
Payer: COMMERCIAL

## 2022-11-03 DIAGNOSIS — Z12.31 VISIT FOR SCREENING MAMMOGRAM: ICD-10-CM

## 2022-11-03 LAB
QUANTI TB GOLD PLUS: NEGATIVE
QUANTI TB1 MINUS NIL: 0.02 IU/ML (ref 0–0.34)
QUANTI TB2 MINUS NIL: 0.01 IU/ML (ref 0–0.34)
QUANTIFERON MITOGEN: 6.49 IU/ML
QUANTIFERON NIL: 0.01 IU/ML

## 2022-11-03 PROCEDURE — 77063 BREAST TOMOSYNTHESIS BI: CPT

## 2023-02-22 ENCOUNTER — TELEPHONE (OUTPATIENT)
Dept: FAMILY MEDICINE CLINIC | Age: 53
End: 2023-02-22

## 2023-02-22 RX ORDER — BUPROPION HYDROCHLORIDE 150 MG/1
TABLET ORAL
Qty: 90 TABLET | OUTPATIENT
Start: 2023-02-22

## 2023-02-22 NOTE — TELEPHONE ENCOUNTER
LM for patient to  to get scheduled      Medication:   Requested Prescriptions     Pending Prescriptions Disp Refills    buPROPion (WELLBUTRIN XL) 150 MG extended release tablet [Pharmacy Med Name: BUPROPION XL 150MG TABLETS (24 H)] 90 tablet      Sig: TAKE 1 TABLET BY MOUTH DAILY        Last Filled: We don't fill     Patient Phone Number: [phone] (home)     Last appt: 8/22/2022   Next appt: Visit date not found    Last OARRS: No flowsheet data found.

## 2023-03-01 ENCOUNTER — OFFICE VISIT (OUTPATIENT)
Dept: FAMILY MEDICINE CLINIC | Age: 53
End: 2023-03-01
Payer: COMMERCIAL

## 2023-03-01 VITALS
BODY MASS INDEX: 20.92 KG/M2 | SYSTOLIC BLOOD PRESSURE: 128 MMHG | WEIGHT: 138 LBS | HEIGHT: 68 IN | DIASTOLIC BLOOD PRESSURE: 60 MMHG | HEART RATE: 94 BPM | OXYGEN SATURATION: 98 %

## 2023-03-01 DIAGNOSIS — F32.5 MAJOR DEPRESSIVE DISORDER IN FULL REMISSION, UNSPECIFIED WHETHER RECURRENT (HCC): Primary | ICD-10-CM

## 2023-03-01 DIAGNOSIS — K51.90 ULCERATIVE COLITIS WITHOUT COMPLICATIONS, UNSPECIFIED LOCATION (HCC): ICD-10-CM

## 2023-03-01 PROCEDURE — 99213 OFFICE O/P EST LOW 20 MIN: CPT | Performed by: FAMILY MEDICINE

## 2023-03-01 RX ORDER — BUPROPION HYDROCHLORIDE 150 MG/1
150 TABLET ORAL EVERY MORNING
Qty: 30 TABLET | Refills: 11 | Status: SHIPPED | OUTPATIENT
Start: 2023-03-01

## 2023-03-01 SDOH — ECONOMIC STABILITY: HOUSING INSECURITY
IN THE LAST 12 MONTHS, WAS THERE A TIME WHEN YOU DID NOT HAVE A STEADY PLACE TO SLEEP OR SLEPT IN A SHELTER (INCLUDING NOW)?: NO

## 2023-03-01 SDOH — ECONOMIC STABILITY: FOOD INSECURITY: WITHIN THE PAST 12 MONTHS, THE FOOD YOU BOUGHT JUST DIDN'T LAST AND YOU DIDN'T HAVE MONEY TO GET MORE.: NEVER TRUE

## 2023-03-01 SDOH — ECONOMIC STABILITY: INCOME INSECURITY: HOW HARD IS IT FOR YOU TO PAY FOR THE VERY BASICS LIKE FOOD, HOUSING, MEDICAL CARE, AND HEATING?: NOT HARD AT ALL

## 2023-03-01 SDOH — ECONOMIC STABILITY: FOOD INSECURITY: WITHIN THE PAST 12 MONTHS, YOU WORRIED THAT YOUR FOOD WOULD RUN OUT BEFORE YOU GOT MONEY TO BUY MORE.: NEVER TRUE

## 2023-03-01 NOTE — PROGRESS NOTES
Lu Dupree (:  1970) is a 45 y.o. female,Established patient, here for evaluation of the following chief complaint(s):  Discuss Medications (Patient needs to transfer refill on wellbutrin to PCP.)         ASSESSMENT/PLAN:  Brooklyn Gleason was seen today for discuss medications. Diagnoses and all orders for this visit:    Major depressive disorder in full remission, unspecified whether recurrent ([facility] [address]  Stable on wellbutrin  Ulcerative colitis without complications, unspecified location ([facility] [address]  Stable. Following with GI  Other orders  -     buPROPion (WELLBUTRIN XL) 150 MG extended release tablet; Take 1 tablet by mouth every morning       No follow-ups on file. Declined tdap due to adverse effects with tdap in the past-got really sick. Subjective   SUBJECTIVE/OBJECTIVE:  HPI  Pt is a of 45 y.o. female comes in today with   Chief Complaint   Patient presents with    Discuss Medications     Patient needs to transfer refill on wellbutrin to PCP. Following with GI  Recently tried entivio but not tolerating it  Rediaganosed with UC vs crohns. Has been on wellbutrin for years. Previously failed cymbalta. Was started for depression    Vitals:    23 1342   BP: 128/60   Site: Left Upper Arm   Position: Sitting   Cuff Size: Medium Adult   Pulse: 94   SpO2: 98%   Weight: 138 lb (62.6 kg)   Height: 5' 8\" (1.727 m)      Review of Systems       Objective   Physical Exam         An electronic signature was used to authenticate this note.     --Carla Mims MD

## 2023-03-08 ENCOUNTER — OFFICE VISIT (OUTPATIENT)
Dept: FAMILY MEDICINE CLINIC | Age: 53
End: 2023-03-08
Payer: COMMERCIAL

## 2023-03-08 VITALS
SYSTOLIC BLOOD PRESSURE: 108 MMHG | TEMPERATURE: 98.8 F | BODY MASS INDEX: 20.88 KG/M2 | HEIGHT: 68 IN | DIASTOLIC BLOOD PRESSURE: 62 MMHG | WEIGHT: 137.8 LBS | OXYGEN SATURATION: 98 % | HEART RATE: 76 BPM

## 2023-03-08 DIAGNOSIS — R05.1 ACUTE COUGH: Primary | ICD-10-CM

## 2023-03-08 DIAGNOSIS — R05.1 ACUTE COUGH: ICD-10-CM

## 2023-03-08 LAB — D DIMER: 0.44 UG/ML FEU (ref 0–0.6)

## 2023-03-08 PROCEDURE — 99213 OFFICE O/P EST LOW 20 MIN: CPT | Performed by: FAMILY MEDICINE

## 2023-03-08 RX ORDER — ALBUTEROL SULFATE 90 UG/1
2 AEROSOL, METERED RESPIRATORY (INHALATION) 4 TIMES DAILY PRN
Qty: 18 G | Refills: 5 | Status: SHIPPED | OUTPATIENT
Start: 2023-03-08

## 2023-03-08 NOTE — PROGRESS NOTES
Gregory Escobar (:  1970) is a 45 y.o. female,Established patient, here for evaluation of the following chief complaint(s): Other (Chest discomfort, feels like she needs to cough but is unable to do so. Used inhaler this morning. )         ASSESSMENT/PLAN:  Nilay Mittal was seen today for other. Diagnoses and all orders for this visit:    Acute cough  -     D-Dimer, Quantitative; Future  Reviewed diagnosis of bronchitis and differential diagnosis, including post infectious cough. Prescription medications for symptom relief reviewed, as well as their possible side effects and adverse effects. Supportive care including rest and otc treatments (honey, lemon, tea, humidified air) reviewed. Call if worsening cough or chest pain, fever, chills, or myalgias develop. R/o PE since recent travel  Other orders  -     albuterol sulfate HFA (VENTOLIN HFA) 108 (90 Base) MCG/ACT inhaler; Inhale 2 puffs into the lungs 4 times daily as needed for Wheezing       No follow-ups on file. Subjective   SUBJECTIVE/OBJECTIVE:  HPI  Pt is a of 45 y.o. female comes in today with   Chief Complaint   Patient presents with    Other     Chest discomfort, feels like she needs to cough but is unable to do so. Used inhaler this morning. Went to [place] from -  Finished amox for strep on Saturday as well. Feels like she needs to cough but can't  Using son's inhaler which has helped. No fever. No congestion  Worse when lying down for awhile. Vitals:    23 1005   BP: 108/62   Site: Left Upper Arm   Position: Sitting   Cuff Size: Small Adult   Pulse: 76   Temp: 98.8 °F (37.1 °C)   TempSrc: Temporal   SpO2: 98%   Weight: 137 lb 12.8 oz (62.5 kg)   Height: 5' 8\" (1.727 m)      Review of Systems       Objective   Physical Exam  Constitutional:       General: She is not in acute distress. Appearance: Normal appearance. She is well-developed. She is not diaphoretic. HENT:      Head: Normocephalic and atraumatic. Mouth/Throat:      Pharynx: Oropharynx is clear. No oropharyngeal exudate. Eyes:      General: No scleral icterus. Conjunctiva/sclera: Conjunctivae normal.   Cardiovascular:      Rate and Rhythm: Normal rate and regular rhythm. Heart sounds: Normal heart sounds. No murmur heard. Pulmonary:      Effort: Pulmonary effort is normal. No respiratory distress. Breath sounds: No wheezing or rales. Musculoskeletal:      Cervical back: Neck supple. Lymphadenopathy:      Head:      Right side of head: No submandibular or preauricular adenopathy. Left side of head: No submandibular or preauricular adenopathy. Cervical: No cervical adenopathy. Skin:     General: Skin is warm and dry. Capillary Refill: Capillary refill takes less than 2 seconds. Findings: No erythema. Nails: There is no clubbing. Neurological:      General: No focal deficit present. Mental Status: She is alert and oriented to person, place, and time. Cranial Nerves: No cranial nerve deficit. Psychiatric:         Behavior: Behavior normal.         Thought Content: Thought content normal.         Judgment: Judgment normal.            An electronic signature was used to authenticate this note.     --Susan Caban MD

## 2023-04-05 ENCOUNTER — OFFICE VISIT (OUTPATIENT)
Dept: FAMILY MEDICINE CLINIC | Age: 53
End: 2023-04-05
Payer: COMMERCIAL

## 2023-04-05 ENCOUNTER — HOSPITAL ENCOUNTER (OUTPATIENT)
Dept: GENERAL RADIOLOGY | Age: 53
Discharge: HOME | End: 2023-04-05
Payer: COMMERCIAL

## 2023-04-05 VITALS
HEART RATE: 93 BPM | OXYGEN SATURATION: 98 % | SYSTOLIC BLOOD PRESSURE: 106 MMHG | BODY MASS INDEX: 21.07 KG/M2 | WEIGHT: 139 LBS | HEIGHT: 68 IN | DIASTOLIC BLOOD PRESSURE: 74 MMHG

## 2023-04-05 DIAGNOSIS — R05.1 ACUTE COUGH: ICD-10-CM

## 2023-04-05 DIAGNOSIS — R05.1 ACUTE COUGH: Primary | ICD-10-CM

## 2023-04-05 PROCEDURE — 71046 X-RAY EXAM CHEST 2 VIEWS: CPT

## 2023-04-05 PROCEDURE — 99213 OFFICE O/P EST LOW 20 MIN: CPT | Performed by: FAMILY MEDICINE

## 2023-04-05 NOTE — PROGRESS NOTES
Lord Baugh (:  1970) is a 52 y.o. female,Established patient, here for evaluation of the following chief complaint(s):  Follow-up (Had pneumonia )         ASSESSMENT/PLAN:  Blair was seen today for follow-up. Diagnoses and all orders for this visit:    Acute cough  -     XR CHEST (2 VW); Future    Holding additional antibiotics pending chest X-ray      No follow-ups on file. Subjective   SUBJECTIVE/OBJECTIVE:  HPI  Pt is a of 52 y.o. female comes in today with   Chief Complaint   Patient presents with    Follow-up     Had pneumonia      Went to [facility] for cough. Treated with z taye and steroid. Stayed on 20 mg until 2 days ago. Using albuterol  Still can't work out. Vitals:    23 1525   BP: 106/74   Site: Left Upper Arm   Position: Sitting   Cuff Size: Medium Adult   Pulse: 93   SpO2: 98%   Weight: 139 lb (63 kg)   Height: 5' 8\" (1.727 m)      Past Medical History:Reviewed  Medications:Reviewed. Allergies   Allergen Reactions    Sulfa Antibiotics Hives and Rash    Tetanus-Diphtheria Toxoids Td      Fever, myalgias    Sulfamethoxazole-Trimethoprim Hives and Rash      Social hx:Reviewed. Social History     Tobacco Use   Smoking Status Never   Smokeless Tobacco Never     Review of Systems       Objective   Physical Exam  Constitutional:       Appearance: Normal appearance. Cardiovascular:      Rate and Rhythm: Normal rate and regular rhythm. Heart sounds: No murmur heard. Pulmonary:      Effort: Pulmonary effort is normal.      Breath sounds: Normal breath sounds. No wheezing or rales. Skin:     General: Skin is warm and dry. Capillary Refill: Capillary refill takes less than 2 seconds. Neurological:      Mental Status: She is alert. An electronic signature was used to authenticate this note.     --Cristino Ruelas MD

## 2023-09-25 ENCOUNTER — OFFICE VISIT (OUTPATIENT)
Dept: FAMILY MEDICINE CLINIC | Age: 53
End: 2023-09-25
Payer: COMMERCIAL

## 2023-09-25 VITALS
OXYGEN SATURATION: 98 % | DIASTOLIC BLOOD PRESSURE: 70 MMHG | HEART RATE: 100 BPM | WEIGHT: 140 LBS | BODY MASS INDEX: 21.22 KG/M2 | SYSTOLIC BLOOD PRESSURE: 110 MMHG | HEIGHT: 68 IN

## 2023-09-25 DIAGNOSIS — Z78.0 POST-MENOPAUSAL: ICD-10-CM

## 2023-09-25 DIAGNOSIS — K51.90 ULCERATIVE COLITIS WITHOUT COMPLICATIONS, UNSPECIFIED LOCATION (HCC): ICD-10-CM

## 2023-09-25 DIAGNOSIS — Z00.00 WELL ADULT EXAM: Primary | ICD-10-CM

## 2023-09-25 PROCEDURE — 99396 PREV VISIT EST AGE 40-64: CPT | Performed by: FAMILY MEDICINE

## 2023-09-25 ASSESSMENT — ENCOUNTER SYMPTOMS: RESPIRATORY NEGATIVE: 1

## 2023-09-25 NOTE — PROGRESS NOTES
Sonia Mathias (:  1970) is a 52 y.o. female,Established patient, here for evaluation of the following chief complaint(s): Annual Exam         ASSESSMENT/PLAN:  Cole Chase was seen today for annual exam.    Diagnoses and all orders for this visit:    Well adult exam  -     DEXA BONE DENSITY AXIAL SKELETON; Future  -     Lipid Panel; Future  -     Comprehensive Metabolic Panel; Future  -     Vitamin D 25 Hydroxy; Future  Good diet and exercise  Declined tdap due to prior side effects   Post-menopausal  -     DEXA BONE DENSITY AXIAL SKELETON; Future  -     Vitamin D 25 Hydroxy; Future    Ulcerative colitis without complications, unspecified location ([address])  -     DEXA BONE DENSITY AXIAL SKELETON; Future  Has been stable. No follow-ups on file. Subjective   SUBJECTIVE/OBJECTIVE:  STEVE  Pt is a of 52 y.o. female comes in today with   Chief Complaint   Patient presents with    Annual Exam     Has been exercising regularly. Good exercise tolerance. Vitals:    23 1124   BP: 110/70   Pulse: 100   SpO2: 98%   Weight: 140 lb (63.5 kg)   Height: 5' 8\" (1.727 m)        Past Medical History:Reviewed  Medications:Reviewed. Allergies   Allergen Reactions    Sulfa Antibiotics Hives and Rash    Tetanus-Diphtheria Toxoids Td      Fever, myalgias    Sulfamethoxazole-Trimethoprim Hives and Rash      Social hx:Reviewed. Social History     Tobacco Use   Smoking Status Never   Smokeless Tobacco Never        Review of Systems   Constitutional: Negative. Respiratory: Negative. Cardiovascular: Negative. Objective   Physical Exam  Constitutional:       General: She is not in acute distress. Appearance: Normal appearance. She is well-developed. She is not diaphoretic. HENT:      Head: Normocephalic and atraumatic. Eyes:      General: No scleral icterus. Neck:      Thyroid: No thyroid mass or thyromegaly. Trachea: No tracheal deviation.    Cardiovascular:      Rate and Rhythm:

## 2023-09-27 DIAGNOSIS — Z78.0 POST-MENOPAUSAL: ICD-10-CM

## 2023-09-27 DIAGNOSIS — Z00.00 WELL ADULT EXAM: ICD-10-CM

## 2023-09-27 LAB
25(OH)D3 SERPL-MCNC: 102.6 NG/ML
ALBUMIN SERPL-MCNC: 4.3 G/DL (ref 3.4–5)
ALBUMIN/GLOB SERPL: 1.7 {RATIO} (ref 1.1–2.2)
ALP SERPL-CCNC: 42 U/L (ref 40–129)
ALT SERPL-CCNC: 53 U/L (ref 10–40)
ANION GAP SERPL CALCULATED.3IONS-SCNC: 9 MMOL/L (ref 3–16)
AST SERPL-CCNC: 74 U/L (ref 15–37)
BILIRUB SERPL-MCNC: 0.4 MG/DL (ref 0–1)
BUN SERPL-MCNC: 12 MG/DL (ref 7–20)
CALCIUM SERPL-MCNC: 8.9 MG/DL (ref 8.3–10.6)
CHLORIDE SERPL-SCNC: 106 MMOL/L (ref 99–110)
CHOLEST SERPL-MCNC: 188 MG/DL (ref 0–199)
CO2 SERPL-SCNC: 24 MMOL/L (ref 21–32)
CREAT SERPL-MCNC: 0.8 MG/DL (ref 0.6–1.1)
GFR SERPLBLD CREATININE-BSD FMLA CKD-EPI: >60 ML/MIN/{1.73_M2}
GLUCOSE SERPL-MCNC: 96 MG/DL (ref 70–99)
HDLC SERPL-MCNC: 51 MG/DL (ref 40–60)
LDLC SERPL CALC-MCNC: 115 MG/DL
POTASSIUM SERPL-SCNC: 4.3 MMOL/L (ref 3.5–5.1)
PROT SERPL-MCNC: 6.9 G/DL (ref 6.4–8.2)
SODIUM SERPL-SCNC: 139 MMOL/L (ref 136–145)
TRIGL SERPL-MCNC: 109 MG/DL (ref 0–150)
VLDLC SERPL CALC-MCNC: 22 MG/DL

## 2023-10-03 DIAGNOSIS — R79.89 ELEVATED LFTS: Primary | ICD-10-CM

## 2023-10-12 ENCOUNTER — HOSPITAL ENCOUNTER (OUTPATIENT)
Dept: GENERAL RADIOLOGY | Age: 53
Discharge: HOME | End: 2023-10-12
Payer: COMMERCIAL

## 2023-10-12 DIAGNOSIS — K51.90 ULCERATIVE COLITIS WITHOUT COMPLICATIONS, UNSPECIFIED LOCATION (HCC): ICD-10-CM

## 2023-10-12 DIAGNOSIS — Z00.00 WELL ADULT EXAM: ICD-10-CM

## 2023-10-12 DIAGNOSIS — Z78.0 POST-MENOPAUSAL: ICD-10-CM

## 2023-10-12 PROCEDURE — 77080 DXA BONE DENSITY AXIAL: CPT

## 2023-10-17 ENCOUNTER — OFFICE VISIT (OUTPATIENT)
Dept: ORTHOPEDIC SURGERY | Age: 53
End: 2023-10-17
Payer: COMMERCIAL

## 2023-10-17 VITALS — HEIGHT: 68 IN | WEIGHT: 140 LBS | BODY MASS INDEX: 21.22 KG/M2

## 2023-10-17 DIAGNOSIS — M17.12 OSTEOARTHRITIS OF LEFT PATELLOFEMORAL JOINT: ICD-10-CM

## 2023-10-17 DIAGNOSIS — M25.561 ACUTE PAIN OF RIGHT KNEE: Primary | ICD-10-CM

## 2023-10-17 DIAGNOSIS — M23.90 EXTENSOR MECHANISM MALALIGNMENT OF KNEE: ICD-10-CM

## 2023-10-17 PROCEDURE — 99213 OFFICE O/P EST LOW 20 MIN: CPT | Performed by: PHYSICIAN ASSISTANT

## 2023-10-18 NOTE — PROGRESS NOTES
52 y.o. female presenting to the office for an evaluation of her acute on chronic right knee pain. The patient has a diagnosis of an exacerbation of pre-existing patellofemoral arthritis. Encounter Diagnoses   Name Primary? Acute pain of right knee Yes    Extensor mechanism malalignment of knee     Osteoarthritis of left patellofemoral joint            Medical decision making:  Patient's workup and evaluation were reviewed with the patient in the office today. Imaging was reviewed with the patient. The patient was educated on conservative treatment for her condition as detailed below. She is currently taking up to 20 mg of prednisone for a Crohn's flare. Given that her symptoms have dramatically improved over the past 24 hours I believe she will do well with conservative treatment. We will hold off on any injection at this time given that she is already taking oral steroids. She was instructed to return in 4 to 6 weeks should her condition not improve or sooner should it worsen. We could administer a viscosupplementation injection as it has been almost over a year since her last 1. All the patient's questions were answered while in the clinic. The patient is understanding of all instructions and agrees with the plan. Treatment Plan:    Conservative arthritis protocol  Patellofemoral precautions  Activity modification/Rest   Ice 20 minutes ever 1-2 hours PRN  Take medications as prescribed/instructed  Elevation   Lightweight exercise/low impact exercise  Appropriate diet/weight management      Follow up: 4 to 6 weeks and as needed      This patient exhibits moderate complexity for obtaining an independent history, reviewing past medical records, independent interpretation of diagnostic imaging, and further coordinating care. The patient exhibits low risk given that the patient is being treated conservatively.           Lorena Siegel PA-C    Physician Assistant - Certified  Orthopedic Surgery

## 2023-11-16 ENCOUNTER — HOSPITAL ENCOUNTER (OUTPATIENT)
Dept: WOMENS IMAGING | Age: 53
Discharge: HOME | End: 2023-11-16
Payer: COMMERCIAL

## 2023-11-16 VITALS — BODY MASS INDEX: 21.22 KG/M2 | WEIGHT: 140 LBS | HEIGHT: 68 IN

## 2023-11-16 DIAGNOSIS — R79.89 ELEVATED LFTS: ICD-10-CM

## 2023-11-16 DIAGNOSIS — Z12.31 VISIT FOR SCREENING MAMMOGRAM: ICD-10-CM

## 2023-11-16 LAB
ALBUMIN SERPL-MCNC: 4.1 G/DL (ref 3.4–5)
ALP SERPL-CCNC: 43 U/L (ref 40–129)
ALT SERPL-CCNC: 11 U/L (ref 10–40)
AST SERPL-CCNC: 10 U/L (ref 15–37)
BILIRUB DIRECT SERPL-MCNC: <0.2 MG/DL (ref 0–0.3)
BILIRUB INDIRECT SERPL-MCNC: ABNORMAL MG/DL (ref 0–1)
BILIRUB SERPL-MCNC: 0.4 MG/DL (ref 0–1)
CRP SERPL-MCNC: <3 MG/L (ref 0–5.1)
FERRITIN SERPL IA-MCNC: 452.2 NG/ML (ref 15–150)
HCV AB SERPL QL IA: NORMAL
LACTOFERRIN STL QL IA: ABNORMAL
PROT SERPL-MCNC: 6.5 G/DL (ref 6.4–8.2)

## 2023-11-16 PROCEDURE — 77063 BREAST TOMOSYNTHESIS BI: CPT

## 2023-11-17 LAB
C DIFF TOX A+B STL QL IA: NORMAL
GI PATHOGENS PNL STL NAA+PROBE: NORMAL

## 2023-11-19 LAB — CALPROTECTIN STL-MCNT: 701 UG/G

## 2023-11-20 LAB — MISCELLANEOUS LAB TEST ORDER: NORMAL

## 2023-11-22 LAB — INTERPRETATION: NEGATIVE

## 2023-11-24 DIAGNOSIS — R92.8 ABNORMAL MAMMOGRAM: Primary | ICD-10-CM

## 2023-11-30 ENCOUNTER — HOSPITAL ENCOUNTER (OUTPATIENT)
Dept: MAMMOGRAPHY | Age: 53
Discharge: HOME | End: 2023-11-30
Payer: COMMERCIAL

## 2023-11-30 DIAGNOSIS — R92.8 ABNORMAL MAMMOGRAM: ICD-10-CM

## 2023-11-30 PROCEDURE — G0279 TOMOSYNTHESIS, MAMMO: HCPCS

## 2024-02-16 RX ORDER — BUPROPION HYDROCHLORIDE 150 MG/1
150 TABLET ORAL EVERY MORNING
Qty: 30 TABLET | Refills: 11 | Status: SHIPPED | OUTPATIENT
Start: 2024-02-16

## 2024-02-16 NOTE — TELEPHONE ENCOUNTER
Medication:   Requested Prescriptions     Pending Prescriptions Disp Refills    buPROPion (WELLBUTRIN XL) 150 MG extended release tablet [Pharmacy Med Name: BUPROPION XL 150MG TABLETS (24 H)] 30 tablet 11     Sig: TAKE 1 TABLET BY MOUTH EVERY MORNING        Last Filled:  3/1/23    Patient Phone Number: [phone] (home)     Last appt: 9/25/2023   Next appt: Visit date not found    Last OARRS:        No data to display

## 2024-03-11 RX ORDER — ALBUTEROL SULFATE 90 UG/1
AEROSOL, METERED RESPIRATORY (INHALATION)
Qty: 6.7 G | Refills: 2 | Status: SHIPPED | OUTPATIENT
Start: 2024-03-11

## 2024-03-11 NOTE — TELEPHONE ENCOUNTER
Medication:   Requested Prescriptions     Pending Prescriptions Disp Refills    albuterol sulfate HFA (PROVENTIL;VENTOLIN;PROAIR) 108 (90 Base) MCG/ACT inhaler [Pharmacy Med Name: ALBUTEROL HFA INH (200 PUFFS) 6.7GM] 6.7 g      Sig: INHALE 2 PUFFS INTO THE LUNGS FOUR TIMES DAILY AS NEEDED FOR WHEEZING        Last Filled:  9/25/23    Patient Phone Number: [phone] (home)     Last appt: 9/25/2023   Next appt: Visit date not found    Last OARRS:        No data to display

## 2024-08-14 ENCOUNTER — OFFICE VISIT (OUTPATIENT)
Dept: FAMILY MEDICINE CLINIC | Age: 54
End: 2024-08-14
Payer: COMMERCIAL

## 2024-08-14 VITALS
WEIGHT: 138.8 LBS | BODY MASS INDEX: 21.04 KG/M2 | OXYGEN SATURATION: 98 % | HEIGHT: 68 IN | SYSTOLIC BLOOD PRESSURE: 102 MMHG | HEART RATE: 81 BPM | DIASTOLIC BLOOD PRESSURE: 64 MMHG

## 2024-08-14 DIAGNOSIS — J34.0 NASAL SEPTAL ULCER: ICD-10-CM

## 2024-08-14 DIAGNOSIS — K51.90 ULCERATIVE COLITIS WITHOUT COMPLICATIONS, UNSPECIFIED LOCATION (HCC): ICD-10-CM

## 2024-08-14 DIAGNOSIS — Z00.00 WELL ADULT EXAM: ICD-10-CM

## 2024-08-14 DIAGNOSIS — Z00.00 WELL ADULT EXAM: Primary | ICD-10-CM

## 2024-08-14 LAB
ALBUMIN SERPL-MCNC: 4.2 G/DL (ref 3.4–5)
ALBUMIN/GLOB SERPL: 1.6 {RATIO} (ref 1.1–2.2)
ALP SERPL-CCNC: 50 U/L (ref 40–129)
ALT SERPL-CCNC: 11 U/L (ref 10–40)
ANION GAP SERPL CALCULATED.3IONS-SCNC: 12 MMOL/L (ref 3–16)
AST SERPL-CCNC: 17 U/L (ref 15–37)
BILIRUB SERPL-MCNC: <0.2 MG/DL (ref 0–1)
BUN SERPL-MCNC: 14 MG/DL (ref 7–20)
CALCIUM SERPL-MCNC: 9.5 MG/DL (ref 8.3–10.6)
CHLORIDE SERPL-SCNC: 106 MMOL/L (ref 99–110)
CHOLEST SERPL-MCNC: 247 MG/DL (ref 0–199)
CO2 SERPL-SCNC: 23 MMOL/L (ref 21–32)
CREAT SERPL-MCNC: 0.9 MG/DL (ref 0.6–1.1)
DEPRECATED RDW RBC AUTO: 13.8 % (ref 12.4–15.4)
GFR SERPLBLD CREATININE-BSD FMLA CKD-EPI: 75 ML/MIN/{1.73_M2}
GLUCOSE SERPL-MCNC: 81 MG/DL (ref 70–99)
HCT VFR BLD AUTO: 37.6 % (ref 36–48)
HDLC SERPL-MCNC: 53 MG/DL (ref 40–60)
HGB BLD-MCNC: 12.3 G/DL (ref 12–16)
LDLC SERPL CALC-MCNC: 174 MG/DL
MCH RBC QN AUTO: 31.7 PG (ref 26–34)
MCHC RBC AUTO-ENTMCNC: 32.6 G/DL (ref 31–36)
MCV RBC AUTO: 97.3 FL (ref 80–100)
PLATELET # BLD AUTO: 356 K/UL (ref 135–450)
PMV BLD AUTO: 7.4 FL (ref 5–10.5)
POTASSIUM SERPL-SCNC: 4.3 MMOL/L (ref 3.5–5.1)
PROT SERPL-MCNC: 6.8 G/DL (ref 6.4–8.2)
RBC # BLD AUTO: 3.87 M/UL (ref 4–5.2)
SODIUM SERPL-SCNC: 141 MMOL/L (ref 136–145)
TRIGL SERPL-MCNC: 102 MG/DL (ref 0–150)
TSH SERPL DL<=0.005 MIU/L-ACNC: 2.38 UIU/ML (ref 0.27–4.2)
VLDLC SERPL CALC-MCNC: 20 MG/DL
WBC # BLD AUTO: 6.9 K/UL (ref 4–11)

## 2024-08-14 PROCEDURE — 99396 PREV VISIT EST AGE 40-64: CPT | Performed by: FAMILY MEDICINE

## 2024-08-14 SDOH — ECONOMIC STABILITY: FOOD INSECURITY: WITHIN THE PAST 12 MONTHS, YOU WORRIED THAT YOUR FOOD WOULD RUN OUT BEFORE YOU GOT MONEY TO BUY MORE.: NEVER TRUE

## 2024-08-14 SDOH — ECONOMIC STABILITY: INCOME INSECURITY: HOW HARD IS IT FOR YOU TO PAY FOR THE VERY BASICS LIKE FOOD, HOUSING, MEDICAL CARE, AND HEATING?: NOT HARD AT ALL

## 2024-08-14 SDOH — ECONOMIC STABILITY: FOOD INSECURITY: WITHIN THE PAST 12 MONTHS, THE FOOD YOU BOUGHT JUST DIDN'T LAST AND YOU DIDN'T HAVE MONEY TO GET MORE.: NEVER TRUE

## 2024-08-14 ASSESSMENT — ANXIETY QUESTIONNAIRES
GAD7 TOTAL SCORE: 0
4. TROUBLE RELAXING: NOT AT ALL
1. FEELING NERVOUS, ANXIOUS, OR ON EDGE: NOT AT ALL
2. NOT BEING ABLE TO STOP OR CONTROL WORRYING: NOT AT ALL
3. WORRYING TOO MUCH ABOUT DIFFERENT THINGS: NOT AT ALL
5. BEING SO RESTLESS THAT IT IS HARD TO SIT STILL: NOT AT ALL
7. FEELING AFRAID AS IF SOMETHING AWFUL MIGHT HAPPEN: NOT AT ALL
6. BECOMING EASILY ANNOYED OR IRRITABLE: NOT AT ALL

## 2024-08-14 ASSESSMENT — PATIENT HEALTH QUESTIONNAIRE - PHQ9
4. FEELING TIRED OR HAVING LITTLE ENERGY: NOT AT ALL
1. LITTLE INTEREST OR PLEASURE IN DOING THINGS: NOT AT ALL
SUM OF ALL RESPONSES TO PHQ QUESTIONS 1-9: 0
SUM OF ALL RESPONSES TO PHQ QUESTIONS 1-9: 0
5. POOR APPETITE OR OVEREATING: NOT AT ALL
7. TROUBLE CONCENTRATING ON THINGS, SUCH AS READING THE NEWSPAPER OR WATCHING TELEVISION: NOT AT ALL
SUM OF ALL RESPONSES TO PHQ QUESTIONS 1-9: 0
3. TROUBLE FALLING OR STAYING ASLEEP: NOT AT ALL
9. THOUGHTS THAT YOU WOULD BE BETTER OFF DEAD, OR OF HURTING YOURSELF: NOT AT ALL
10. IF YOU CHECKED OFF ANY PROBLEMS, HOW DIFFICULT HAVE THESE PROBLEMS MADE IT FOR YOU TO DO YOUR WORK, TAKE CARE OF THINGS AT HOME, OR GET ALONG WITH OTHER PEOPLE: NOT DIFFICULT AT ALL
SUM OF ALL RESPONSES TO PHQ9 QUESTIONS 1 & 2: 0
SUM OF ALL RESPONSES TO PHQ QUESTIONS 1-9: 0
8. MOVING OR SPEAKING SO SLOWLY THAT OTHER PEOPLE COULD HAVE NOTICED. OR THE OPPOSITE, BEING SO FIGETY OR RESTLESS THAT YOU HAVE BEEN MOVING AROUND A LOT MORE THAN USUAL: NOT AT ALL
6. FEELING BAD ABOUT YOURSELF - OR THAT YOU ARE A FAILURE OR HAVE LET YOURSELF OR YOUR FAMILY DOWN: NOT AT ALL
2. FEELING DOWN, DEPRESSED OR HOPELESS: NOT AT ALL

## 2024-08-14 ASSESSMENT — ENCOUNTER SYMPTOMS: RESPIRATORY NEGATIVE: 1

## 2024-08-14 NOTE — PROGRESS NOTES
Kelly Monzon (:  1970) is a 54 y.o. female,Established patient, here for evaluation of the following chief complaint(s):  Annual Exam (Pt states she have problems with her nose)      Assessment & Plan   ASSESSMENT/PLAN:  Kelly was seen today for annual exam.    Diagnoses and all orders for this visit:    Well adult exam  -     Lipid Panel; Future  -     Comprehensive Metabolic Panel; Future  -     CBC; Future  -     TSH with Reflex; Future  Reviewed diet and exercise.  Ulcerative colitis without complications, unspecified location (HCC)  Stable; following with GI.   Nasal septal ulcer  Culture sent.  Topical mupirocin  Referral to ent if does not resolve    No follow-ups on file.         Subjective   SUBJECTIVE/OBJECTIVE:  HPI  Pt is a of 54 y.o. female comes in today with   Chief Complaint   Patient presents with    Annual Exam     Pt states she have problems with her nose     Feels like something is blocking right sinus. A lot of drainage.   Has been a few months.  Feeling well.  Following with GI.      Vitals:    24 0814   BP: 102/64   Pulse: 81   SpO2: 98%   Weight: 63 kg (138 lb 12.8 oz)   Height: 1.727 m (5' 8\")       Past Medical History:Reviewed  Medications:Reviewed.  Allergies   Allergen Reactions    Sulfa Antibiotics Hives and Rash    Tetanus-Diphtheria Toxoids Td      Fever, myalgias    Sulfamethoxazole-Trimethoprim Hives and Rash      Social hx:Reviewed.  Social History     Tobacco Use   Smoking Status Never   Smokeless Tobacco Never       Review of Systems   Constitutional: Negative.    Respiratory: Negative.     Cardiovascular: Negative.           Objective   Physical Exam  Constitutional:       General: She is not in acute distress.     Appearance: Normal appearance. She is well-developed.   HENT:      Head: Normocephalic.      Mouth/Throat:      Pharynx: Oropharynx is clear.   Eyes:      General: No scleral icterus.     Conjunctiva/sclera: Conjunctivae normal.   Neck:

## 2024-08-16 LAB
BACTERIA NOSE AEROBE CULT: ABNORMAL
BACTERIA NOSE AEROBE CULT: ABNORMAL
ORGANISM: ABNORMAL

## 2024-08-16 RX ORDER — DOXYCYCLINE HYCLATE 100 MG
100 TABLET ORAL 2 TIMES DAILY
Qty: 20 TABLET | Refills: 0 | Status: SHIPPED | OUTPATIENT
Start: 2024-08-16 | End: 2024-08-26

## 2024-08-22 ENCOUNTER — OFFICE VISIT (OUTPATIENT)
Dept: ENT CLINIC | Age: 54
End: 2024-08-22

## 2024-08-22 VITALS — BODY MASS INDEX: 20.95 KG/M2 | HEIGHT: 68 IN | WEIGHT: 138.2 LBS | HEART RATE: 80 BPM | TEMPERATURE: 98.2 F

## 2024-08-22 DIAGNOSIS — Z22.322 MRSA NASAL COLONIZATION: Primary | ICD-10-CM

## 2024-08-22 DIAGNOSIS — J34.89 NASAL SORE: ICD-10-CM

## 2024-08-22 ASSESSMENT — ENCOUNTER SYMPTOMS
EYE REDNESS: 0
NAUSEA: 0
SINUS PAIN: 0
EYE PAIN: 0
TROUBLE SWALLOWING: 0
EYE ITCHING: 0
RHINORRHEA: 0
COUGH: 0
SHORTNESS OF BREATH: 0
FACIAL SWELLING: 0
SINUS PRESSURE: 0
SORE THROAT: 0
DIARRHEA: 0
VOICE CHANGE: 0
CHOKING: 0

## 2024-08-22 NOTE — PROGRESS NOTES
Subjective:      Patient ID: Kelly Monzon is a 54 y.o. female.    HPI  Chief Complaint   Patient presents with    Nsal sore  History of Present Illness:Kelly is a(n) 54 y.o. female who presents with a recent diagnosis of right sided nasal MSRA. Referred by PCP due to nasal sore. Patient states burning and congestion. Uses cleanex or qtip to clean. Occasional blood on qtip. Denies sinusitis or fiacial numbness.     Patient Active Problem List   Diagnosis    On hormone replacement therapy    Ulcerative colitis (HCC)     Past Surgical History:   Procedure Laterality Date    BACK SURGERY      jia placed for scoliosis age 11    COLONOSCOPY      Every 2-3 years    COLONOSCOPY N/A 7/23/2020    COLONOSCOPY WITH BIOPSY performed by Khoa Ramirez MD at [facility] ENDOSCOPY    ESOPHAGEAL DILATATION N/A 7/23/2020    ESOPHAGEAL DILATION LEIGH performed by Koha Ramirez MD at [facility] ENDOSCOPY    NECK SURGERY      SINUS SURGERY      TONSILLECTOMY      UPPER GASTROINTESTINAL ENDOSCOPY N/A 7/23/2020    EGD BIOPSY performed by Khoa Ramirez MD at [facility] ENDOSCOPY     Family History   Problem Relation Age of Onset    Rheum Arthritis Mother     Heart Disease Father     Cancer Father         skin    Prostate Cancer Father     Other Sister         chrones    Other Sister         chrones    Liver Disease Sister         hep c    No Known Problems Brother     No Known Problems Brother     No Known Problems Brother     No Known Problems Brother     No Known Problems Brother     No Known Problems Daughter     No Known Problems Son     No Known Problems Maternal Grandfather     Thyroid Disease Paternal Grandmother     Heart Disease Paternal Grandfather     Heart Attack Paternal Grandfather     Breast Cancer Neg Hx     Ovarian Cancer Neg Hx      Social History     Socioeconomic History    Marital status:      Spouse name: Not on file    Number of children: Not on file    Years of education: Not on file    Highest education

## 2024-08-23 ENCOUNTER — PATIENT MESSAGE (OUTPATIENT)
Dept: ENT CLINIC | Age: 54
End: 2024-08-23

## 2024-08-23 DIAGNOSIS — T78.40XA ALLERGIC REACTION, INITIAL ENCOUNTER: Primary | ICD-10-CM

## 2024-08-23 RX ORDER — PREDNISONE 10 MG/1
40 TABLET ORAL DAILY
Qty: 20 TABLET | Refills: 0 | Status: SHIPPED | OUTPATIENT
Start: 2024-08-23 | End: 2024-08-28

## 2024-08-23 NOTE — TELEPHONE ENCOUNTER
Called patient via telephone an informed her of Dr Zavala note in detail. I apologized as well. States \"that's ok.\" Patient aware that prednisone was called to pharmacy and if worsening symptoms to seek emergency care.   Patient allergy list updated.

## 2024-08-27 ENCOUNTER — PATIENT MESSAGE (OUTPATIENT)
Dept: FAMILY MEDICINE CLINIC | Age: 54
End: 2024-08-27

## 2024-08-27 DIAGNOSIS — E78.2 MIXED HYPERLIPIDEMIA: Primary | ICD-10-CM

## 2024-09-19 ENCOUNTER — OFFICE VISIT (OUTPATIENT)
Dept: ENT CLINIC | Age: 54
End: 2024-09-19

## 2024-09-19 VITALS
RESPIRATION RATE: 16 BRPM | HEART RATE: 74 BPM | TEMPERATURE: 98.4 F | SYSTOLIC BLOOD PRESSURE: 113 MMHG | HEIGHT: 68 IN | DIASTOLIC BLOOD PRESSURE: 72 MMHG | WEIGHT: 140 LBS | BODY MASS INDEX: 21.22 KG/M2

## 2024-09-19 DIAGNOSIS — J34.2 DEVIATED NASAL SEPTUM: ICD-10-CM

## 2024-09-19 DIAGNOSIS — J34.89 NASAL SORE: ICD-10-CM

## 2024-09-19 DIAGNOSIS — Z22.322 MRSA NASAL COLONIZATION: Primary | ICD-10-CM

## 2024-11-04 RX ORDER — DOXYCYCLINE HYCLATE 100 MG
TABLET ORAL
Qty: 20 TABLET | Refills: 0 | OUTPATIENT
Start: 2024-11-04

## 2024-11-04 NOTE — TELEPHONE ENCOUNTER
Medication:   Requested Prescriptions     Pending Prescriptions Disp Refills    doxycycline hyclate (VIBRA-TABS) 100 MG tablet [Pharmacy Med Name: DOXYCYCLINE HYC 100MG TABS] 20 tablet 0     Sig: TAKE 1 TABLET BY MOUTH TWICE DAILY FOR 10 DAYS       Last Filled:      Patient Phone Number: [phone] (home)     Last appt: 8/14/2024   Next appt: Visit date not found    Last Labs DM: No results found for: \"LABA1C\"  Last Lipid:   Lab Results   Component Value Date/Time    CHOL 247 08/14/2024 09:12 AM    TRIG 102 08/14/2024 09:12 AM    HDL 53 08/14/2024 09:12 AM     Last PSA: No results found for: \"PSA\"  Last Thyroid:   Lab Results   Component Value Date/Time    TSH 2.38 08/14/2024 09:12 AM

## 2024-11-18 ENCOUNTER — HOSPITAL ENCOUNTER (OUTPATIENT)
Dept: WOMENS IMAGING | Age: 54
Discharge: HOME | End: 2024-11-18
Payer: COMMERCIAL

## 2024-11-18 VITALS — WEIGHT: 140 LBS | HEIGHT: 68 IN | BODY MASS INDEX: 21.22 KG/M2

## 2024-11-18 DIAGNOSIS — Z12.31 VISIT FOR SCREENING MAMMOGRAM: ICD-10-CM

## 2024-11-18 PROCEDURE — 77063 BREAST TOMOSYNTHESIS BI: CPT

## 2024-12-19 ENCOUNTER — OFFICE VISIT (OUTPATIENT)
Dept: ENT CLINIC | Age: 54
End: 2024-12-19
Payer: COMMERCIAL

## 2024-12-19 VITALS — DIASTOLIC BLOOD PRESSURE: 75 MMHG | SYSTOLIC BLOOD PRESSURE: 114 MMHG | HEART RATE: 75 BPM | TEMPERATURE: 98.2 F

## 2024-12-19 DIAGNOSIS — J34.2 DEVIATED NASAL SEPTUM: ICD-10-CM

## 2024-12-19 DIAGNOSIS — J34.89 NASAL SORE: Primary | ICD-10-CM

## 2024-12-19 PROCEDURE — 99212 OFFICE O/P EST SF 10 MIN: CPT | Performed by: OTOLARYNGOLOGY

## 2024-12-19 RX ORDER — PREDNISONE 10 MG/1
10 TABLET ORAL
COMMUNITY
Start: 2024-12-10

## 2024-12-19 NOTE — PROGRESS NOTES
Subjective:      Patient ID: Kelly Monzon is a 54 y.o. female.    HPI  Chief Complaint   Patient presents with    Nsal sore  History of Present Illness:Kelly is a(n) 54 y.o. female who presents with a recent diagnosis of right sided nasal MSRA. Referred by PCP due to nasal sore. Patient states burning and congestion. Uses cleanex or qtip to clean. Occasional blood on qtip. Denies sinusitis or fiacial numbness.     Patient here in follow up. Better but still some crusting. No bleeding. Finished mupirocin. Using AYR. A bit improved but still struggling      Patient Active Problem List   Diagnosis    On hormone replacement therapy    Ulcerative colitis (HCC)     Past Surgical History:   Procedure Laterality Date    BACK SURGERY      jia placed for scoliosis age 11    COLONOSCOPY      Every 2-3 years    COLONOSCOPY N/A 7/23/2020    COLONOSCOPY WITH BIOPSY performed by Khoa Ramirez MD at [facility] ENDOSCOPY    ESOPHAGEAL DILATATION N/A 7/23/2020    ESOPHAGEAL DILATION LEIGH performed by Khoa Ramirez MD at [facility] ENDOSCOPY    NECK SURGERY      SINUS SURGERY      TONSILLECTOMY      UPPER GASTROINTESTINAL ENDOSCOPY N/A 7/23/2020    EGD BIOPSY performed by Khoa Ramirez MD at [facility] ENDOSCOPY     Family History   Problem Relation Age of Onset    Rheum Arthritis Mother     Heart Disease Father     Cancer Father         skin    Prostate Cancer Father     Other Sister         chrones    Other Sister         chrones    Liver Disease Sister         hep c    No Known Problems Brother     No Known Problems Brother     No Known Problems Brother     No Known Problems Brother     No Known Problems Brother     No Known Problems Daughter     No Known Problems Son     No Known Problems Maternal Grandfather     Thyroid Disease Paternal Grandmother     Heart Disease Paternal Grandfather     Heart Attack Paternal Grandfather     Breast Cancer Neg Hx     Ovarian Cancer Neg Hx      Social History     Socioeconomic History

## 2025-02-17 RX ORDER — BUPROPION HYDROCHLORIDE 150 MG/1
150 TABLET ORAL EVERY MORNING
Qty: 30 TABLET | Refills: 11 | Status: SHIPPED | OUTPATIENT
Start: 2025-02-17

## 2025-02-17 NOTE — TELEPHONE ENCOUNTER
Medication:   Requested Prescriptions     Pending Prescriptions Disp Refills    buPROPion (WELLBUTRIN XL) 150 MG extended release tablet [Pharmacy Med Name: BUPROPION XL 150MG TABLETS (24 H)] 30 tablet 11     Sig: TAKE 1 TABLET BY MOUTH EVERY MORNING       Last Filled:  2/16/24    Patient Phone Number: [phone] (home)     Last appt: 8/14/2024   Next appt: 2/18/2025    Last Labs DM: No results found for: \"LABA1C\"  Last Lipid:   Lab Results   Component Value Date/Time    CHOL 247 08/14/2024 09:12 AM    TRIG 102 08/14/2024 09:12 AM    HDL 53 08/14/2024 09:12 AM     Last PSA: No results found for: \"PSA\"  Last Thyroid:   Lab Results   Component Value Date/Time    TSH 2.38 08/14/2024 09:12 AM

## 2025-02-18 ENCOUNTER — OFFICE VISIT (OUTPATIENT)
Dept: FAMILY MEDICINE CLINIC | Age: 55
End: 2025-02-18
Payer: COMMERCIAL

## 2025-02-18 VITALS
BODY MASS INDEX: 21.22 KG/M2 | HEART RATE: 109 BPM | OXYGEN SATURATION: 98 % | WEIGHT: 140 LBS | HEIGHT: 68 IN | DIASTOLIC BLOOD PRESSURE: 76 MMHG | SYSTOLIC BLOOD PRESSURE: 108 MMHG

## 2025-02-18 DIAGNOSIS — B96.89 ACUTE BACTERIAL SINUSITIS: Primary | ICD-10-CM

## 2025-02-18 DIAGNOSIS — J01.90 ACUTE BACTERIAL SINUSITIS: Primary | ICD-10-CM

## 2025-02-18 PROCEDURE — 99213 OFFICE O/P EST LOW 20 MIN: CPT | Performed by: FAMILY MEDICINE

## 2025-02-18 SDOH — ECONOMIC STABILITY: FOOD INSECURITY: WITHIN THE PAST 12 MONTHS, YOU WORRIED THAT YOUR FOOD WOULD RUN OUT BEFORE YOU GOT MONEY TO BUY MORE.: NEVER TRUE

## 2025-02-18 SDOH — ECONOMIC STABILITY: FOOD INSECURITY: WITHIN THE PAST 12 MONTHS, THE FOOD YOU BOUGHT JUST DIDN'T LAST AND YOU DIDN'T HAVE MONEY TO GET MORE.: NEVER TRUE

## 2025-02-18 ASSESSMENT — PATIENT HEALTH QUESTIONNAIRE - PHQ9
7. TROUBLE CONCENTRATING ON THINGS, SUCH AS READING THE NEWSPAPER OR WATCHING TELEVISION: NOT AT ALL
3. TROUBLE FALLING OR STAYING ASLEEP: NOT AT ALL
SUM OF ALL RESPONSES TO PHQ9 QUESTIONS 1 & 2: 0
5. POOR APPETITE OR OVEREATING: NOT AT ALL
SUM OF ALL RESPONSES TO PHQ QUESTIONS 1-9: 0
SUM OF ALL RESPONSES TO PHQ QUESTIONS 1-9: 0
8. MOVING OR SPEAKING SO SLOWLY THAT OTHER PEOPLE COULD HAVE NOTICED. OR THE OPPOSITE, BEING SO FIGETY OR RESTLESS THAT YOU HAVE BEEN MOVING AROUND A LOT MORE THAN USUAL: NOT AT ALL
6. FEELING BAD ABOUT YOURSELF - OR THAT YOU ARE A FAILURE OR HAVE LET YOURSELF OR YOUR FAMILY DOWN: NOT AT ALL
4. FEELING TIRED OR HAVING LITTLE ENERGY: NOT AT ALL
SUM OF ALL RESPONSES TO PHQ QUESTIONS 1-9: 0
9. THOUGHTS THAT YOU WOULD BE BETTER OFF DEAD, OR OF HURTING YOURSELF: NOT AT ALL
2. FEELING DOWN, DEPRESSED OR HOPELESS: NOT AT ALL
SUM OF ALL RESPONSES TO PHQ QUESTIONS 1-9: 0
10. IF YOU CHECKED OFF ANY PROBLEMS, HOW DIFFICULT HAVE THESE PROBLEMS MADE IT FOR YOU TO DO YOUR WORK, TAKE CARE OF THINGS AT HOME, OR GET ALONG WITH OTHER PEOPLE: NOT DIFFICULT AT ALL
1. LITTLE INTEREST OR PLEASURE IN DOING THINGS: NOT AT ALL

## 2025-02-18 NOTE — PROGRESS NOTES
Kelly Monzon (:  1970) is a 54 y.o. female,Established patient, here for evaluation of the following chief complaint(s):  Other (Flu symptoms still lingering )      Assessment & Plan   ASSESSMENT/PLAN:  Kelly was seen today for other.    Diagnoses and all orders for this visit:    Acute bacterial sinusitis    Other orders  -     amoxicillin-clavulanate (AUGMENTIN) 875-125 MG per tablet; Take 1 tablet by mouth 2 times daily for 10 days       Acute, new  Covering with antibiotics  No follow-ups on file.         Subjective   SUBJECTIVE/OBJECTIVE:  HPI  Pt is a of 54 y.o. female comes in today with   Chief Complaint   Patient presents with    Other     Flu symptoms still lingering      Lingering cough since flu. Got sick 2 weeks ago.  On prednisone 15 mg for crohn's and on albuterol    Vitals:    25 1114   BP: 108/76   Pulse: (!) 109   SpO2: 98%   Weight: 63.5 kg (140 lb)   Height: 1.727 m (5' 8\")      Review of Systems       Objective   Physical Exam  Constitutional:       Appearance: She is well-developed.   HENT:      Right Ear: Tympanic membrane, ear canal and external ear normal.      Left Ear: Tympanic membrane, ear canal and external ear normal.      Mouth/Throat:      Pharynx: Oropharynx is clear. No oropharyngeal exudate.   Eyes:      General: No scleral icterus.     Conjunctiva/sclera: Conjunctivae normal.   Cardiovascular:      Rate and Rhythm: Normal rate and regular rhythm.      Heart sounds: Normal heart sounds. No murmur heard.  Pulmonary:      Effort: Pulmonary effort is normal. No respiratory distress.      Breath sounds: No wheezing or rales.   Musculoskeletal:      Cervical back: Neck supple.   Lymphadenopathy:      Head:      Right side of head: No submandibular or preauricular adenopathy.      Left side of head: No submandibular or preauricular adenopathy.      Cervical: No cervical adenopathy.   Skin:     General: Skin is warm and dry.      Findings: No erythema.      Nails:

## 2025-04-23 ENCOUNTER — OFFICE VISIT (OUTPATIENT)
Dept: FAMILY MEDICINE CLINIC | Age: 55
End: 2025-04-23
Payer: COMMERCIAL

## 2025-04-23 VITALS
OXYGEN SATURATION: 99 % | HEIGHT: 68 IN | WEIGHT: 140 LBS | BODY MASS INDEX: 21.22 KG/M2 | HEART RATE: 83 BPM | SYSTOLIC BLOOD PRESSURE: 110 MMHG | DIASTOLIC BLOOD PRESSURE: 62 MMHG

## 2025-04-23 DIAGNOSIS — E78.2 MIXED HYPERLIPIDEMIA: ICD-10-CM

## 2025-04-23 DIAGNOSIS — Z01.818 PREOP EXAMINATION: Primary | ICD-10-CM

## 2025-04-23 PROCEDURE — 99213 OFFICE O/P EST LOW 20 MIN: CPT | Performed by: FAMILY MEDICINE

## 2025-04-23 ASSESSMENT — ENCOUNTER SYMPTOMS: RESPIRATORY NEGATIVE: 1

## 2025-04-23 NOTE — PROGRESS NOTES
Kelly Monzon (:  1970) is a 55 y.o. female,Established patient, here for evaluation of the following chief complaint(s):  Pre-op Exam (5/15/25: RHYTIDECTOMY/Caden Bowling MD)      Assessment & Plan   ASSESSMENT/PLAN:  Kelly was seen today for pre-op exam.    Diagnoses and all orders for this visit:    Preop examination  Medically patient is at acceptable risk to undergo planned surgery.   Mixed hyperlipidemia  Recheck to see if stable with diet       No follow-ups on file.         Subjective   SUBJECTIVE/OBJECTIVE:  HPI  Pt is a of 55 y.o. female comes in today with   Chief Complaint   Patient presents with    Pre-op Exam     5/15/25: RHYTIDECTOMY  Caden Bowling MD     No personal or family history of adverse reaction to anesthesia or bleeding tendency.     Vitals:    25 0933   BP: 110/62   Pulse: 83   SpO2: 99%   Weight: 63.5 kg (140 lb)   Height: 1.727 m (5' 8\")      Allergies   Allergen Reactions    Other Itching and Swelling     TRIPLE ANTIBIOTIC CONTAINING POLYMYXIN B SULFATE    Sulfa Antibiotics Hives and Rash    Tetanus-Diphtheria Toxoids Td      Fever, myalgias    Sulfamethoxazole-Trimethoprim Hives and Rash       Current Outpatient Medications on File Prior to Visit   Medication Sig Dispense Refill    buPROPion (WELLBUTRIN XL) 150 MG extended release tablet TAKE 1 TABLET BY MOUTH EVERY MORNING 30 tablet 11    predniSONE (DELTASONE) 10 MG tablet 1 tablet      progesterone (PROMETRIUM) 200 MG capsule Take 1 capsule by mouth daily       No current facility-administered medications on file prior to visit.       Past Medical History:   Diagnosis Date    Abnormal Pap smear of cervix     Anxiety     Autoimmune disorder     Crohn disease (HCC)     Depression     Menopausal symptoms     Scoliosis        Past Surgical History:   Procedure Laterality Date    BACK SURGERY      jia placed for scoliosis age 11    COLONOSCOPY      Every 2-3 years    COLONOSCOPY N/A 2020    COLONOSCOPY WITH BIOPSY

## 2025-05-29 ENCOUNTER — PROCEDURE VISIT (OUTPATIENT)
Dept: AUDIOLOGY | Age: 55
End: 2025-05-29
Payer: COMMERCIAL

## 2025-05-29 DIAGNOSIS — H93.13 TINNITUS OF BOTH EARS: ICD-10-CM

## 2025-05-29 DIAGNOSIS — H90.3 SENSORINEURAL HEARING LOSS (SNHL) OF BOTH EARS: Primary | ICD-10-CM

## 2025-05-29 PROCEDURE — 92567 TYMPANOMETRY: CPT | Performed by: AUDIOLOGIST

## 2025-05-29 PROCEDURE — 92557 COMPREHENSIVE HEARING TEST: CPT | Performed by: AUDIOLOGIST

## 2025-05-29 NOTE — PROGRESS NOTES
Kelly Monzon   1970, 55 y.o. female   [id number]       Referring Provider: SELF  Referral Type: N/A    Reason for Visit: Evaluation of suspected change in hearing, tinnitus, or balance.    ADULT AUDIOLOGIC EVALUATION      Kelly Monzon is a 55 y.o. female seen today, 5/29/2025, for an initial audiologic evaluation.      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     She denied otalgia, aural fullness, otorrhea, dizziness, and imbalance.    IMPRESSIONS:        Today's results revealed hearing sensitivity within normal limits to mild sensorineural hearing loss with excellent word recognition for soft conversational speech in both ears; right ear with hypermobile TM and left ear with normal middle ear function. Reviewed findings with patient and discussed tinnitus management strategies.    Follow medical recommendations of Te Zavala MD, PhD.     ASSESSMENT AND FINDINGS:       Otoscopy revealed: Clear ear canals bilaterally    RIGHT EAR:  Hearing Sensitivity: Within normal limits to mild sensorineural hearing loss; loss greatest in mid-frequencies.  Speech Recognition Threshold: 20 dBHL  Word Recognition: Excellent (100%), based on NU-6 25-word list at 50 dBHL, masked, using recorded speech stimuli.    Tympanometry: Normal peak pressure with high compliance, Type Ad tympanogram, consistent with hypermobile tympanic membrane.       LEFT EAR:  Hearing Sensitivity: Within normal limits to mild sensorineural hearing loss; loss greatest in mid-frequencies.  Speech

## 2025-06-26 ENCOUNTER — OFFICE VISIT (OUTPATIENT)
Dept: ENT CLINIC | Age: 55
End: 2025-06-26
Payer: COMMERCIAL

## 2025-06-26 VITALS
HEIGHT: 68 IN | BODY MASS INDEX: 21.22 KG/M2 | HEART RATE: 83 BPM | TEMPERATURE: 97.8 F | SYSTOLIC BLOOD PRESSURE: 106 MMHG | DIASTOLIC BLOOD PRESSURE: 71 MMHG | WEIGHT: 140 LBS

## 2025-06-26 DIAGNOSIS — H93.13 TINNITUS OF BOTH EARS: Primary | ICD-10-CM

## 2025-06-26 PROCEDURE — 99214 OFFICE O/P EST MOD 30 MIN: CPT | Performed by: OTOLARYNGOLOGY

## 2025-06-26 ASSESSMENT — ENCOUNTER SYMPTOMS
FACIAL SWELLING: 0
SHORTNESS OF BREATH: 0
SORE THROAT: 0
VOICE CHANGE: 0
EYE REDNESS: 0
EYE PAIN: 0
SINUS PAIN: 0
DIARRHEA: 0
SINUS PRESSURE: 0
RHINORRHEA: 0
TROUBLE SWALLOWING: 0
CHOKING: 0
NAUSEA: 0
EYE ITCHING: 0
COUGH: 0

## 2025-06-26 NOTE — PROGRESS NOTES
Subjective:      Patient ID: Kelly Monzon is a 55 y.o. female.    HPI  Hearing Loss HPI  CC: tinnitus    General: Kelly is a(n) 55 y.o. female who presents with a few month history of tinnitus, bilateral but maybe worse in left. 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 Positve family history of age reated hearing loss.   Prior audiogram:No  Previous episodes: no  Tinnitus:Yes  Otorrhea:No  Vertigo:No  Prior ear surgery: No  Ear trauma: No  History of hearing loss: No      Patient Active Problem List   Diagnosis    On hormone replacement therapy    Ulcerative colitis (HCC)     Past Surgical History:   Procedure Laterality Date    BACK SURGERY      jia placed for scoliosis age 11    COLONOSCOPY      Every 2-3 years    COLONOSCOPY N/A 7/23/2020    COLONOSCOPY WITH BIOPSY performed by Khoa Ramirez MD at [facility] ENDOSCOPY    ESOPHAGEAL DILATATION N/A 7/23/2020    ESOPHAGEAL DILATION LEIGH performed by Khoa Ramirez MD at [facility] ENDOSCOPY    NECK SURGERY      SINUS SURGERY      TONSILLECTOMY      UPPER GASTROINTESTINAL ENDOSCOPY N/A 7/23/2020    EGD BIOPSY performed by Khoa Ramirez MD at [facility] ENDOSCOPY     Family History   Problem Relation Age of Onset    Rheum Arthritis Mother     Heart Disease Father     Cancer Father         skin    Prostate Cancer Father     Other Sister         chrones    Other Sister         chrones    Liver Disease Sister         hep c    No Known Problems Brother     No Known Problems Brother     No Known Problems Brother     No Known Problems Brother     No Known Problems Brother     No Known Problems Daughter     No Known Problems Son     No Known Problems Maternal Grandfather     Thyroid Disease Paternal Grandmother     Heart Disease Paternal Grandfather     Heart Attack Paternal Grandfather     Breast Cancer Neg Hx     Ovarian Cancer Neg Hx      Social History     Socioeconomic History    Marital status:      Spouse name: Not on file

## 2025-08-22 ENCOUNTER — OFFICE VISIT (OUTPATIENT)
Dept: FAMILY MEDICINE CLINIC | Age: 55
End: 2025-08-22
Payer: COMMERCIAL

## 2025-08-22 VITALS
OXYGEN SATURATION: 99 % | TEMPERATURE: 97.6 F | WEIGHT: 139.4 LBS | DIASTOLIC BLOOD PRESSURE: 70 MMHG | HEART RATE: 77 BPM | BODY MASS INDEX: 21.13 KG/M2 | SYSTOLIC BLOOD PRESSURE: 114 MMHG | HEIGHT: 68 IN

## 2025-08-22 DIAGNOSIS — Z01.419 WELL WOMAN EXAM WITH ROUTINE GYNECOLOGICAL EXAM: Primary | ICD-10-CM

## 2025-08-22 DIAGNOSIS — Z78.0 POST-MENOPAUSAL: ICD-10-CM

## 2025-08-22 PROCEDURE — 99396 PREV VISIT EST AGE 40-64: CPT | Performed by: FAMILY MEDICINE

## 2025-08-26 ENCOUNTER — RESULTS FOLLOW-UP (OUTPATIENT)
Dept: FAMILY MEDICINE CLINIC | Age: 55
End: 2025-08-26

## 2025-08-26 DIAGNOSIS — E78.2 MIXED HYPERLIPIDEMIA: Primary | ICD-10-CM

## 2025-08-26 LAB
HPV HR 12 DNA SPEC QL NAA+PROBE: NOT DETECTED
HPV16 DNA SPEC QL NAA+PROBE: NOT DETECTED
HPV16+18+H RISK 12 DNA SPEC-IMP: NORMAL
HPV18 DNA SPEC QL NAA+PROBE: NOT DETECTED

## 2025-08-27 DIAGNOSIS — Z01.419 WELL WOMAN EXAM WITH ROUTINE GYNECOLOGICAL EXAM: ICD-10-CM

## 2025-08-27 LAB
ALBUMIN SERPL-MCNC: 4.1 G/DL (ref 3.4–5)
ALBUMIN/GLOB SERPL: 1.5 {RATIO} (ref 1.1–2.2)
ALP SERPL-CCNC: 46 U/L (ref 40–129)
ALT SERPL-CCNC: 10 U/L (ref 10–40)
ANION GAP SERPL CALCULATED.3IONS-SCNC: 10 MMOL/L (ref 3–16)
AST SERPL-CCNC: 17 U/L (ref 15–37)
BILIRUB SERPL-MCNC: 0.5 MG/DL (ref 0–1)
BUN SERPL-MCNC: 11 MG/DL (ref 7–20)
CALCIUM SERPL-MCNC: 9.1 MG/DL (ref 8.3–10.6)
CHLORIDE SERPL-SCNC: 105 MMOL/L (ref 99–110)
CHOLEST SERPL-MCNC: 237 MG/DL (ref 0–199)
CO2 SERPL-SCNC: 25 MMOL/L (ref 21–32)
CREAT SERPL-MCNC: 0.8 MG/DL (ref 0.6–1.1)
DEPRECATED RDW RBC AUTO: 13.8 % (ref 12.4–15.4)
GFR SERPLBLD CREATININE-BSD FMLA CKD-EPI: 86 ML/MIN/{1.73_M2}
GLUCOSE SERPL-MCNC: 88 MG/DL (ref 70–99)
HCT VFR BLD AUTO: 35.6 % (ref 36–48)
HDLC SERPL-MCNC: 49 MG/DL (ref 40–60)
HGB BLD-MCNC: 12.3 G/DL (ref 12–16)
LDLC SERPL CALC-MCNC: 168 MG/DL
MCH RBC QN AUTO: 32.1 PG (ref 26–34)
MCHC RBC AUTO-ENTMCNC: 34.6 G/DL (ref 31–36)
MCV RBC AUTO: 92.8 FL (ref 80–100)
PLATELET # BLD AUTO: 328 K/UL (ref 135–450)
PMV BLD AUTO: 7.8 FL (ref 5–10.5)
POTASSIUM SERPL-SCNC: 4.2 MMOL/L (ref 3.5–5.1)
PROT SERPL-MCNC: 6.8 G/DL (ref 6.4–8.2)
RBC # BLD AUTO: 3.84 M/UL (ref 4–5.2)
SODIUM SERPL-SCNC: 140 MMOL/L (ref 136–145)
TRIGL SERPL-MCNC: 102 MG/DL (ref 0–150)
TSH SERPL DL<=0.005 MIU/L-ACNC: 2.81 UIU/ML (ref 0.27–4.2)
VLDLC SERPL CALC-MCNC: 20 MG/DL
WBC # BLD AUTO: 5.5 K/UL (ref 4–11)

## (undated) DEVICE — FORCEPS BX L240CM JAW DIA2.4MM ORNG L CAP W/ NDL DISP RAD